# Patient Record
Sex: FEMALE | Race: WHITE | URBAN - METROPOLITAN AREA
[De-identification: names, ages, dates, MRNs, and addresses within clinical notes are randomized per-mention and may not be internally consistent; named-entity substitution may affect disease eponyms.]

---

## 2017-03-27 ENCOUNTER — DOCTOR'S OFFICE (OUTPATIENT)
Dept: URBAN - METROPOLITAN AREA CLINIC 137 | Facility: CLINIC | Age: 37
Setting detail: OPHTHALMOLOGY
End: 2017-03-27
Payer: COMMERCIAL

## 2017-03-27 DIAGNOSIS — H04.123: ICD-10-CM

## 2017-03-27 DIAGNOSIS — H52.13: ICD-10-CM

## 2017-03-27 DIAGNOSIS — H52.223: ICD-10-CM

## 2017-03-27 PROCEDURE — 92004 COMPRE OPH EXAM NEW PT 1/>: CPT | Performed by: OPHTHALMOLOGY

## 2017-03-27 ASSESSMENT — REFRACTION_AUTOREFRACTION
OS_AXIS: 168
OD_SPHERE: -0.50
OS_SPHERE: -1.25
OS_CYLINDER: +2.00
OD_AXIS: 172
OD_CYLINDER: +1.25

## 2017-03-27 ASSESSMENT — REFRACTION_MANIFEST
OD_VA1: 20/
OS_VA2: 20/
OS_VA1: 20/
OD_VA1: 20/
OD_VA2: 20/
OD_VA3: 20/
OS_VA1: 20/
OD_VA3: 20/
OS_VA3: 20/
OU_VA: 20/
OS_VA3: 20/
OD_VA2: 20/
OU_VA: 20/
OS_VA2: 20/

## 2017-03-27 ASSESSMENT — CONFRONTATIONAL VISUAL FIELD TEST (CVF)
OS_FINDINGS: FULL
OD_FINDINGS: FULL

## 2017-03-27 ASSESSMENT — REFRACTION_CURRENTRX
OD_OVR_VA: 20/
OD_OVR_VA: 20/
OS_OVR_VA: 20/
OD_OVR_VA: 20/

## 2017-03-27 ASSESSMENT — REFRACTION_OUTSIDERX
OU_VA: 20/
OD_CYLINDER: +1.25
OD_VA1: 20/20
OS_VA3: 20/
OS_SPHERE: -1.25
OS_CYLINDER: +2.00
OD_AXIS: 172
OD_SPHERE: -0.50
OS_VA2: 20/20(J1+)
OD_VA2: 20/20(J1+)
OS_VA1: 20/20
OS_AXIS: 168
OD_VA3: 20/

## 2017-03-27 ASSESSMENT — SPHEQUIV_DERIVED
OD_SPHEQUIV: 0.125
OS_SPHEQUIV: -0.25

## 2017-03-27 ASSESSMENT — VISUAL ACUITY
OS_BCVA: 20/25-
OD_BCVA: 20/40-

## 2017-04-05 ENCOUNTER — DOCTOR'S OFFICE (OUTPATIENT)
Dept: URBAN - METROPOLITAN AREA CLINIC 137 | Facility: CLINIC | Age: 37
Setting detail: OPHTHALMOLOGY
End: 2017-04-05
Payer: COMMERCIAL

## 2017-04-05 DIAGNOSIS — H52.223: ICD-10-CM

## 2017-04-05 PROCEDURE — 92310 CONTACT LENS FITTING OU: CPT | Performed by: OPHTHALMOLOGY

## 2017-04-05 ASSESSMENT — REFRACTION_MANIFEST
OS_VA1: 20/
OS_VA2: 20/
OD_VA1: 20/
OS_VA3: 20/
OS_VA2: 20/
OS_VA3: 20/
OD_VA3: 20/
OS_VA1: 20/
OD_VA1: 20/
OU_VA: 20/
OU_VA: 20/
OD_VA3: 20/
OD_VA2: 20/
OD_VA2: 20/

## 2017-04-05 ASSESSMENT — SPHEQUIV_DERIVED
OD_SPHEQUIV: 0.125
OS_SPHEQUIV: -0.25

## 2017-04-05 ASSESSMENT — REFRACTION_OUTSIDERX
OD_AXIS: 172
OS_SPHERE: -1.25
OD_VA1: 20/20
OS_VA3: 20/
OU_VA: 20/
OS_CYLINDER: +2.00
OS_VA1: 20/20
OS_AXIS: 168
OD_VA3: 20/
OD_VA2: 20/20(J1+)
OD_CYLINDER: +1.25
OS_VA2: 20/20(J1+)
OD_SPHERE: -0.50

## 2017-04-05 ASSESSMENT — REFRACTION_AUTOREFRACTION
OS_CYLINDER: +2.00
OS_SPHERE: -1.25
OD_AXIS: 172
OD_SPHERE: -0.50
OS_AXIS: 168
OD_CYLINDER: +1.25

## 2017-04-05 ASSESSMENT — REFRACTION_CURRENTRX
OS_OVR_VA: 20/
OD_OVR_VA: 20/

## 2017-04-05 ASSESSMENT — VISUAL ACUITY
OS_BCVA: 20/20
OD_BCVA: 20/20

## 2018-08-07 ENCOUNTER — OPTICAL OFFICE (OUTPATIENT)
Dept: URBAN - METROPOLITAN AREA CLINIC 146 | Facility: CLINIC | Age: 38
Setting detail: OPHTHALMOLOGY
End: 2018-08-07

## 2018-08-07 ENCOUNTER — RX ONLY (RX ONLY)
Age: 38
End: 2018-08-07

## 2018-08-07 ENCOUNTER — DOCTOR'S OFFICE (OUTPATIENT)
Dept: URBAN - METROPOLITAN AREA CLINIC 137 | Facility: CLINIC | Age: 38
Setting detail: OPHTHALMOLOGY
End: 2018-08-07
Payer: COMMERCIAL

## 2018-08-07 DIAGNOSIS — H52.13: ICD-10-CM

## 2018-08-07 DIAGNOSIS — H52.223: ICD-10-CM

## 2018-08-07 PROBLEM — H04.123 DRY EYE; BOTH EYES: Status: ACTIVE | Noted: 2017-04-05

## 2018-08-07 PROCEDURE — V2103 SPHEROCYLINDR 4.00D/12-2.00D: HCPCS | Performed by: OPHTHALMOLOGY

## 2018-08-07 PROCEDURE — V2020 VISION SVCS FRAMES PURCHASES: HCPCS | Performed by: OPHTHALMOLOGY

## 2018-08-07 PROCEDURE — 92014 COMPRE OPH EXAM EST PT 1/>: CPT | Performed by: OPHTHALMOLOGY

## 2018-08-07 ASSESSMENT — CONFRONTATIONAL VISUAL FIELD TEST (CVF)
OD_FINDINGS: FULL
OS_FINDINGS: FULL

## 2018-08-07 ASSESSMENT — SPHEQUIV_DERIVED
OD_SPHEQUIV: 0.125
OS_SPHEQUIV: -0.25

## 2018-08-07 ASSESSMENT — REFRACTION_OUTSIDERX
OD_VA3: 20/
OS_AXIS: 170
OU_VA: 20/
OD_CYLINDER: +1.00
OS_CYLINDER: +2.00
OD_VA1: 20/20
OS_SPHERE: -1.25
OS_VA2: 20/20(J1+)
OS_CYLINDER: +2.00
OD_VA1: 20/20
OS_VA1: 20/20
OD_SPHERE: -0.50
OD_VA2: 20/20(J1+)
OS_VA1: 20/20
OS_VA3: 20/
OS_AXIS: 168
OD_VA2: 20/20(J1+)
OS_VA2: 20/20(J1+)
OD_CYLINDER: +1.25
OU_VA: 20/
OS_SPHERE: -1.25
OD_VA3: 20/
OS_VA3: 20/
OD_AXIS: 170
OD_AXIS: 172
OD_SPHERE: -0.50

## 2018-08-07 ASSESSMENT — REFRACTION_AUTOREFRACTION
OD_CYLINDER: +1.25
OS_CYLINDER: +2.00
OS_SPHERE: -1.25
OD_AXIS: 168
OS_AXIS: 175
OD_SPHERE: -0.50

## 2018-08-07 ASSESSMENT — REFRACTION_CURRENTRX
OD_OVR_VA: 20/
OS_OVR_VA: 20/

## 2018-08-07 ASSESSMENT — REFRACTION_MANIFEST
OU_VA: 20/
OS_VA2: 20/
OD_VA3: 20/
OD_VA1: 20/
OS_VA1: 20/
OS_VA3: 20/
OD_VA2: 20/

## 2018-08-07 ASSESSMENT — VISUAL ACUITY
OD_BCVA: 20/40
OS_BCVA: 20/25

## 2018-12-11 ENCOUNTER — TRANSCRIBE ORDERS (OUTPATIENT)
Dept: ADMINISTRATIVE | Age: 38
End: 2018-12-11

## 2018-12-11 ENCOUNTER — APPOINTMENT (OUTPATIENT)
Dept: LAB | Age: 38
End: 2018-12-11
Attending: PREVENTIVE MEDICINE

## 2018-12-11 DIAGNOSIS — Z02.1 ENCOUNTER FOR PRE-EMPLOYMENT EXAMINATION: Primary | ICD-10-CM

## 2018-12-11 DIAGNOSIS — Z02.1 ENCOUNTER FOR PRE-EMPLOYMENT EXAMINATION: ICD-10-CM

## 2018-12-11 PROCEDURE — 86480 TB TEST CELL IMMUN MEASURE: CPT

## 2018-12-11 PROCEDURE — 36415 COLL VENOUS BLD VENIPUNCTURE: CPT

## 2018-12-13 LAB
GAMMA INTERFERON BACKGROUND BLD IA-ACNC: 0.03 IU/ML
M TB IFN-G BLD-IMP: NEGATIVE
M TB IFN-G CD4+ BCKGRND COR BLD-ACNC: 0 IU/ML
M TB IFN-G CD4+ BCKGRND COR BLD-ACNC: 0 IU/ML
MITOGEN IGNF BCKGRD COR BLD-ACNC: >10 IU/ML

## 2019-07-09 ENCOUNTER — OFFICE VISIT (OUTPATIENT)
Dept: URGENT CARE | Facility: HOSPITAL | Age: 39
End: 2019-07-09
Payer: COMMERCIAL

## 2019-07-09 VITALS
WEIGHT: 132.4 LBS | HEART RATE: 75 BPM | RESPIRATION RATE: 18 BRPM | DIASTOLIC BLOOD PRESSURE: 72 MMHG | SYSTOLIC BLOOD PRESSURE: 114 MMHG | HEIGHT: 63 IN | BODY MASS INDEX: 23.46 KG/M2 | TEMPERATURE: 99 F | OXYGEN SATURATION: 98 %

## 2019-07-09 DIAGNOSIS — L23.7 ALLERGIC DERMATITIS DUE TO POISON IVY: Primary | ICD-10-CM

## 2019-07-09 PROCEDURE — 99203 OFFICE O/P NEW LOW 30 MIN: CPT

## 2019-07-09 RX ORDER — PREDNISONE 20 MG/1
TABLET ORAL
Qty: 15 TABLET | Refills: 0 | Status: SHIPPED | OUTPATIENT
Start: 2019-07-09 | End: 2019-07-27 | Stop reason: ALTCHOICE

## 2019-07-09 NOTE — PATIENT INSTRUCTIONS
Prednisone and Betamethasone prescriptions sent to [facility]  Return if signs of bacterial infection as discussed  Call or return for problems/concerns

## 2019-07-09 NOTE — PROGRESS NOTES
[facility]        NAME: Isabella Avalos is a 46 y o  female  : 1980    MRN: [id number]  DATE: 2019  TIME: 1:43 PM    Assessment and Plan   Allergic dermatitis due to poison ivy [L23 7]  1  Allergic dermatitis due to poison ivy  betamethasone valerate (VALISONE) 0 1 % cream    predniSONE 20 mg tablet         Patient Instructions     Patient Instructions   Prednisone and Betamethasone prescriptions sent to [facility]  Return if signs of bacterial infection as discussed  Call or return for problems/concerns    Follow up with PCP in 3-5 days  Proceed to  ER if symptoms worsen  Chief Complaint     Chief Complaint   Patient presents with    Rash     possible poison x 1 week both arms and legs         History of Present Illness       Rash on b/l arms x 1 week- itchy  Was exposed to poison when weeding her yard      Review of Systems   Review of Systems   Skin: Positive for rash  All other systems reviewed and are negative  Current Medications       Current Outpatient Medications:     betamethasone valerate (VALISONE) 0 1 % cream, Apply topically 2 (two) times a day, Disp: 30 g, Rfl: 0    predniSONE 20 mg tablet, 20mg PO BID x 5 days, then 20mg PO daily x 5 days, Disp: 15 tablet, Rfl: 0    Current Allergies     Allergies as of 2019    (No Known Allergies)            The following portions of the patient's history were reviewed and updated as appropriate: allergies, current medications, past family history, past medical history, past social history, past surgical history and problem list      History reviewed  No pertinent past medical history  Past Surgical History:   Procedure Laterality Date    TUBAL LIGATION         No family history on file  Medications have been verified          Objective   /72 (BP Location: Right arm, Patient Position: Sitting, Cuff Size: Adult)   Pulse 75   Temp 99 °F (37 2 °C) (Tympanic)   Resp 18   Ht 5' 3" (1 6 m)   Wt 60 1 kg (132 lb 6 4 oz)   SpO2 98%   BMI 23 45 kg/m²        Physical Exam     Physical Exam   Constitutional: She is oriented to person, place, and time  Vital signs are normal  She appears well-developed and well-nourished  She is active and cooperative  No distress  Eyes: EOM are normal    Cardiovascular: Normal rate, regular rhythm and normal heart sounds  No murmur heard  Pulmonary/Chest: Effort normal and breath sounds normal  No respiratory distress  She has no wheezes  Neurological: She is alert and oriented to person, place, and time  Skin: Skin is warm and dry  Rash noted  She is not diaphoretic  No erythema  Psychiatric: She has a normal mood and affect  Her behavior is normal  Judgment and thought content normal    Nursing note and vitals reviewed

## 2019-07-27 ENCOUNTER — OFFICE VISIT (OUTPATIENT)
Dept: URGENT CARE | Facility: MEDICAL CENTER | Age: 39
End: 2019-07-27
Payer: COMMERCIAL

## 2019-07-27 VITALS
OXYGEN SATURATION: 99 % | DIASTOLIC BLOOD PRESSURE: 76 MMHG | TEMPERATURE: 97.9 F | SYSTOLIC BLOOD PRESSURE: 116 MMHG | RESPIRATION RATE: 20 BRPM | HEART RATE: 83 BPM

## 2019-07-27 DIAGNOSIS — L23.7 POISON IVY DERMATITIS: Primary | ICD-10-CM

## 2019-07-27 PROCEDURE — 99214 OFFICE O/P EST MOD 30 MIN: CPT | Performed by: FAMILY MEDICINE

## 2019-07-27 RX ORDER — PREDNISONE 10 MG/1
TABLET ORAL
Qty: 30 TABLET | Refills: 0 | Status: SHIPPED | OUTPATIENT
Start: 2019-07-27 | End: 2020-02-19

## 2019-07-27 NOTE — PROGRESS NOTES
[facility]        NAME: Noman Mott is a 46 y o  female  : 1980    MRN: [id number]  DATE: 2019  TIME: 10:37 AM    Assessment and Plan   Poison ivy dermatitis [L23 7]  1  Poison ivy dermatitis  predniSONE 10 mg tablet         Patient Instructions       Follow up with PCP in 3-5 days  Proceed to  ER if symptoms worsen  Chief Complaint     Chief Complaint   Patient presents with    Rash     Pt with red, itchy rash on bilateral forearms since yesterday  Had been working outside in yard  History of Present Illness        80-year-old female presents with the poison ivy rash that started few days back  She states that she was in push  She was also recently on prednisone for sumac poisoning a month ago  She does have localized itching  Rash is on upper and lower extremities  Review of Systems   Review of Systems  As above     Current Medications       Current Outpatient Medications:     predniSONE 10 mg tablet, 5 X 2 days, 4 X 2, 3 X 2, 2 X 2, 1 x 2, Disp: 30 tablet, Rfl: 0    Current Allergies     Allergies as of 2019    (No Known Allergies)            The following portions of the patient's history were reviewed and updated as appropriate: allergies, current medications, past family history, past medical history, past social history, past surgical history and problem list      History reviewed  No pertinent past medical history  Past Surgical History:   Procedure Laterality Date    TUBAL LIGATION         No family history on file  Medications have been verified  Objective   /76 (BP Location: Right arm, Patient Position: Sitting, Cuff Size: Standard)   Pulse 83   Temp 97 9 °F (36 6 °C) (Tympanic)   Resp 20   SpO2 99%        Physical Exam     Physical Exam   Constitutional: She is oriented to person, place, and time  She appears well-developed and well-nourished  HENT:   Head: Normocephalic and atraumatic     Eyes: Conjunctivae are normal    Neck: Neck supple  Cardiovascular: Normal rate  Pulmonary/Chest: Effort normal  No respiratory distress  Abdominal: Soft  Musculoskeletal: Normal range of motion  Neurological: She is alert and oriented to person, place, and time  Skin: Skin is warm and dry  Maculopapular rash on upper and lower extremities   Psychiatric: She has a normal mood and affect  Her behavior is normal    Nursing note and vitals reviewed

## 2019-08-29 ENCOUNTER — OFFICE VISIT (OUTPATIENT)
Dept: URGENT CARE | Facility: HOSPITAL | Age: 39
End: 2019-08-29
Payer: COMMERCIAL

## 2019-08-29 VITALS
DIASTOLIC BLOOD PRESSURE: 70 MMHG | TEMPERATURE: 94 F | HEIGHT: 63 IN | BODY MASS INDEX: 25.3 KG/M2 | OXYGEN SATURATION: 94 % | RESPIRATION RATE: 18 BRPM | WEIGHT: 142.8 LBS | SYSTOLIC BLOOD PRESSURE: 110 MMHG | HEART RATE: 74 BPM

## 2019-08-29 DIAGNOSIS — L24.7 IRRITANT CONTACT DERMATITIS DUE TO PLANTS, EXCEPT FOOD: Primary | ICD-10-CM

## 2019-08-29 PROCEDURE — 99213 OFFICE O/P EST LOW 20 MIN: CPT | Performed by: EMERGENCY MEDICINE

## 2019-08-29 RX ORDER — CLOTRIMAZOLE AND BETAMETHASONE DIPROPIONATE 10; .64 MG/G; MG/G
CREAM TOPICAL 2 TIMES DAILY
Qty: 30 G | Refills: 0 | Status: SHIPPED | OUTPATIENT
Start: 2019-08-29 | End: 2020-02-19

## 2019-08-29 RX ORDER — CEPHALEXIN 500 MG/1
500 CAPSULE ORAL EVERY 12 HOURS SCHEDULED
Qty: 14 CAPSULE | Refills: 0 | Status: SHIPPED | OUTPATIENT
Start: 2019-08-29 | End: 2019-09-05

## 2019-08-29 NOTE — PATIENT INSTRUCTIONS
1   Benedryl 25 - 50 mgs every 6 hours for itching  2  Calamine lotion to areas of rash for comfort  3  Avoid all areas where poison may be growing  4  Follow up with your family doctor if no improvement  Contact Dermatitis   WHAT YOU NEED TO KNOW:   Contact dermatitis is a skin rash  It develops when you touch something that irritates your skin or causes an allergic reaction  DISCHARGE INSTRUCTIONS:   Call 911 for any of the following:   · You have sudden trouble breathing  · Your throat swells and you have trouble eating  · Your face is swollen  Contact your healthcare provider if:   · You have a fever  · Your blisters are draining pus  · Your rash spreads or does not get better, even after treatment  · You have questions or concerns about your condition or care  Medicines:   · Medicines  help decrease itching and swelling  They will be given as a topical medicine to apply to your rash or as a pill  · Take your medicine as directed  Contact your healthcare provider if you think your medicine is not helping or if you have side effects  Tell him or her if you are allergic to any medicine  Keep a list of the medicines, vitamins, and herbs you take  Include the amounts, and when and why you take them  Bring the list or the pill bottles to follow-up visits  Carry your medicine list with you in case of an emergency  Manage contact dermatitis:   · Take short baths or showers in cool water  Use mild soap or soap-free cleansers  Add oatmeal, baking soda, or cornstarch to the bath water to help decrease skin irritation  · Avoid skin irritants , such as makeup, hair products, soaps, and cleansers  Use products that do not contain perfume or dye  · Apply a cool compress to your rash  This will help soothe your skin  · Keep your skin moist   Rub unscented cream or lotion on your skin to prevent dryness and itching   Do this right after a bath or shower when your skin is still damp  Follow up with your healthcare provider or dermatologist in 2 to 3 days:  Write down your questions so you remember to ask them during your visits  © 2017 [employer] Information is for End User's use only and may not be sold, redistributed or otherwise used for commercial purposes  All illustrations and images included in CareNotes® are the copyrighted property of A D A M [employer] Inc  or Dejuan Baltazar  The above information is an  only  It is not intended as medical advice for individual conditions or treatments  Talk to your doctor, nurse or pharmacist before following any medical regimen to see if it is safe and effective for you

## 2019-08-29 NOTE — PROGRESS NOTES
[facility]        NAME: Jannet Young is a 46 y o  female  : 1980    MRN: [id number]  DATE: 2019  TIME: 9:18 AM    Assessment and Plan   Irritant contact dermatitis due to plants, except food [L24 7]  1  Irritant contact dermatitis due to plants, except food  clotrimazole-betamethasone (LOTRISONE) 1-0 05 % cream    cephalexin (KEFLEX) 500 mg capsule     Lotrisone cream 1%      Patient Instructions       Follow up with PCP in 3-5 days  Proceed to  ER if symptoms worsen  Chief Complaint     Chief Complaint   Patient presents with    Rash     Started last thursday    Insect Bite     started last thursday    Edema     swelling on legs and arms         History of Present Illness       This is a 78-year-old female who presents with recurrence rash after she had pulled some weeds from her yd approximately 8 days ago  The rash has progressed on her arms and legs  She has recently been treated twice for poison ivy contact dermatitis and has been on oral steroids  She states the steroids have caused mood disorder  She does note that there is edema and now onset of pain in the right arm reaching into her right axilla  She denies fever  Review of Systems   Review of Systems   Constitutional: Negative  Negative for fever  HENT: Negative  Negative for congestion  Eyes: Negative  Skin: Negative  Rash over her upper and lower extremities  Neurological: Negative  Psychiatric/Behavioral: Negative            Current Medications       Current Outpatient Medications:     cephalexin (KEFLEX) 500 mg capsule, Take 1 capsule (500 mg total) by mouth every 12 (twelve) hours for 7 days, Disp: 14 capsule, Rfl: 0    clotrimazole-betamethasone (LOTRISONE) 1-0 05 % cream, Apply topically 2 (two) times a day, Disp: 30 g, Rfl: 0    predniSONE 10 mg tablet, 5 X 2 days, 4 X 2, 3 X 2, 2 X 2, 1 x 2 (Patient not taking: Reported on 2019), Disp: 30 tablet, Rfl: 0    Current Allergies     Allergies as of 08/29/2019    (No Known Allergies)            The following portions of the patient's history were reviewed and updated as appropriate: allergies, current medications, past family history, past medical history, past social history, past surgical history and problem list      History reviewed  No pertinent past medical history  Past Surgical History:   Procedure Laterality Date    TUBAL LIGATION         History reviewed  No pertinent family history  Medications have been verified  Objective   /70 (BP Location: Left arm, Patient Position: Sitting, Cuff Size: Adult)   Pulse 74   Temp (!) 94 °F (34 4 °C) (Tympanic)   Resp 18   Ht 5' 3" (1 6 m)   Wt 64 8 kg (142 lb 12 8 oz)   SpO2 94%   BMI 25 30 kg/m²        Physical Exam     Physical Exam   Constitutional: She is oriented to person, place, and time  She appears well-developed and well-nourished  Eyes: Pupils are equal, round, and reactive to light  Conjunctivae and EOM are normal    Neck: Normal range of motion  Neck supple  Cardiovascular: Normal rate and regular rhythm  Pulmonary/Chest: Effort normal and breath sounds normal    Musculoskeletal: Normal range of motion  Neurological: She is alert and oriented to person, place, and time  Skin: Skin is dry  There is a generalized rash over the extensor surfaces of both upper extremities from wrist to shoulder worse on the right than the left  The rash  is confluent in areas and patchy in others, raised and vesicular in a linear pattern  There is mild erythema  Similar rashes noted on the inner thighs bilaterally  Nursing note and vitals reviewed  I have discussed the need for an oral steroid because of the diffuse nature of the rash  Patient refuses prednisone at this time as she has had two courses of the oral steroid in the recent past and it has caused mood changes    I have suggested that the betamethasone cream which she is requesting may not be enough to help with the rash at this point  Keflex added for the pain and swelling noted in the right upper extremity  Pt new to the area has no local family doctor  A list of physicians given to patient

## 2020-02-19 ENCOUNTER — OFFICE VISIT (OUTPATIENT)
Dept: FAMILY MEDICINE CLINIC | Facility: CLINIC | Age: 40
End: 2020-02-19
Payer: COMMERCIAL

## 2020-02-19 VITALS
RESPIRATION RATE: 17 BRPM | DIASTOLIC BLOOD PRESSURE: 74 MMHG | OXYGEN SATURATION: 99 % | HEART RATE: 91 BPM | TEMPERATURE: 97.7 F | HEIGHT: 64 IN | SYSTOLIC BLOOD PRESSURE: 112 MMHG | BODY MASS INDEX: 24.59 KG/M2 | WEIGHT: 144 LBS

## 2020-02-19 DIAGNOSIS — Z76.89 ENCOUNTER TO ESTABLISH CARE: ICD-10-CM

## 2020-02-19 DIAGNOSIS — Z11.4 SCREENING FOR HIV (HUMAN IMMUNODEFICIENCY VIRUS): ICD-10-CM

## 2020-02-19 DIAGNOSIS — Z12.4 CERVICAL CANCER SCREENING: ICD-10-CM

## 2020-02-19 DIAGNOSIS — Z13.220 LIPID SCREENING: ICD-10-CM

## 2020-02-19 DIAGNOSIS — Z13.0 SCREENING FOR DEFICIENCY ANEMIA: ICD-10-CM

## 2020-02-19 DIAGNOSIS — Z13.1 DIABETES MELLITUS SCREENING: ICD-10-CM

## 2020-02-19 DIAGNOSIS — Z13.29 THYROID DISORDER SCREEN: ICD-10-CM

## 2020-02-19 DIAGNOSIS — Z00.00 ANNUAL PHYSICAL EXAM: Primary | ICD-10-CM

## 2020-02-19 PROCEDURE — 3008F BODY MASS INDEX DOCD: CPT | Performed by: PHYSICIAN ASSISTANT

## 2020-02-19 PROCEDURE — 99385 PREV VISIT NEW AGE 18-39: CPT | Performed by: PHYSICIAN ASSISTANT

## 2020-02-19 NOTE — PATIENT INSTRUCTIONS

## 2020-02-19 NOTE — PROGRESS NOTES
[address]     NAME: Maki Curry  AGE: 45 y o  SEX: female  : 1980     DATE: 2020     Assessment and Plan:     Problem List Items Addressed This Visit     None      Visit Diagnoses     Annual physical exam    -  Primary    Diabetes mellitus screening        Relevant Orders    Comprehensive metabolic panel    Lipid screening        Relevant Orders    Lipid panel    Screening for HIV (human immunodeficiency virus)        Relevant Orders    HIV 1/2 AG-AB combo    Cervical cancer screening        Relevant Orders    Ambulatory referral to Gynecology    Encounter to establish care        BMI 25 0-25 9,adult        Screening for deficiency anemia        Relevant Orders    CBC and differential    Thyroid disorder screen        Relevant Orders    TSH, 3rd generation with Free T4 reflex          Immunizations and preventive care screenings were discussed with patient today  Appropriate education was printed on patient's after visit summary  Counseling:  Dental Health: discussed importance of regular tooth brushing, flossing, and dental visits  · Exercise: the importance of regular exercise/physical activity was discussed  Recommend exercise 3-5 times per week for at least 30 minutes  · Schedule GYN exam at earliest convenience    BMI Counseling: Body mass index is 25 11 kg/m²  The BMI is above normal  Nutrition recommendations include decreasing portion sizes and encouraging healthy choices of fruits and vegetables  Exercise recommendations include exercising 3-5 times per week  Return in 1 year (on 2021)  Chief Complaint:     Chief Complaint   Patient presents with    Establish Care     Needs physical for employment - has form to be completed  PPD not needed     Unsure when last pap was - completed at facility on [facility]       History of Present Illness:     Adult Annual Physical     Patient here for a comprehensive physical exam and to establish care  The patient reports no problems  She was going to [facility], over 1 year ago  She went to Dr Kamran Yadav in [facility] within last 5 years for GYN but is overdue  She and  just moved to area, bought land  She had 4 children  She is from [place]  She needs physical to start [employer] job  She has no physical ailments requiring limitations  [email or web address] on Friday  Diet and Physical Activity  · Diet/Nutrition: well balanced diet  · Exercise: 3-4 times a week on average  Depression Screening  PHQ-9 Depression Screening    PHQ-9:    Frequency of the following problems over the past two weeks:       Little interest or pleasure in doing things:  0 - not at all  Feeling down, depressed, or hopeless:  0 - not at all  PHQ-2 Score:  0       General Health  · Sleep: sleeps well, gets 4-6 hours of sleep on average, gets 7-8 hours of sleep on average and she works evening shift  · Hearing: normal - right  · Vision: goes for regular eye exams and wears glasses  · Dental: regular dental visits  [address]  · Last menstrual period: last week  · Contraceptive method: tubal ligation in 2010  · History of STDs?: no      Review of Systems:     Review of Systems   Constitutional: Negative for chills, fatigue, fever and unexpected weight change  HENT: Negative for congestion, ear pain, hearing loss, nosebleeds, sore throat and trouble swallowing  Eyes: Negative for pain, discharge and visual disturbance  Respiratory: Negative for cough, shortness of breath and wheezing  Cardiovascular: Negative for chest pain, palpitations and leg swelling  Gastrointestinal: Negative for abdominal pain, blood in stool, constipation, diarrhea, nausea and vomiting  Endocrine: Negative for cold intolerance and heat intolerance     Genitourinary: Negative for difficulty urinating, dysuria and hematuria  Musculoskeletal: Negative for arthralgias, gait problem and myalgias  Skin: Negative for color change, rash and wound  Neurological: Negative for dizziness, syncope, weakness, light-headedness and headaches  Hematological: Negative for adenopathy  Does not bruise/bleed easily  Psychiatric/Behavioral: Negative for confusion and sleep disturbance  The patient is not nervous/anxious  Past Medical History:     History reviewed  No pertinent past medical history     Past Surgical History:     Past Surgical History:   Procedure Laterality Date    TUBAL LIGATION        Social History:        Social History     Socioeconomic History    Marital status: /Civil Union     Spouse name: None    Number of children: None    Years of education: None    Highest education level: None   Occupational History    None   Social Needs    Financial resource strain: None    Food insecurity:     Worry: None     Inability: None    Transportation needs:     Medical: No     Non-medical: No   Tobacco Use    Smoking status: Never Smoker    Smokeless tobacco: Never Used   Substance and Sexual Activity    Alcohol use: Yes     Comment: occasionally     Drug use: Never    Sexual activity: Yes     Partners: Male   Lifestyle    Physical activity:     Days per week: 4 days     Minutes per session: None    Stress: None   Relationships    Social connections:     Talks on phone: None     Gets together: None     Attends Oriental orthodox service: None     Active member of club or organization: None     Attends meetings of clubs or organizations: None     Relationship status: None    Intimate partner violence:     Fear of current or ex partner: None     Emotionally abused: None     Physically abused: None     Forced sexual activity: None   Other Topics Concern    None   Social History Narrative    None      Family History:     Family History   Problem Relation Age of Onset    Stroke Maternal Grandmother Current Medications:     Current Outpatient Medications   Medication Sig Dispense Refill    Biotin 5000 MCG CAPS Take 1 capsule by mouth daily       No current facility-administered medications for this visit  Allergies:     No Known Allergies   Physical Exam:     /74 (BP Location: Left arm, Patient Position: Sitting)   Pulse 91   Temp 97 7 °F (36 5 °C) (Tympanic)   Resp 17   Ht 5' 3 5" (1 613 m)   Wt 65 3 kg (144 lb)   SpO2 99%   BMI 25 11 kg/m²     Physical Exam   Constitutional: She is oriented to person, place, and time  Vital signs are normal  She appears well-developed and well-nourished  No distress  HENT:   Head: Normocephalic and atraumatic  Right Ear: Tympanic membrane, external ear and ear canal normal    Left Ear: Tympanic membrane, external ear and ear canal normal    Nose: Nose normal    Mouth/Throat: Oropharynx is clear and moist    Eyes: Pupils are equal, round, and reactive to light  Conjunctivae and lids are normal    Neck: Trachea normal and normal range of motion  Neck supple  Cardiovascular: Normal rate, regular rhythm, S1 normal, S2 normal and normal heart sounds  No murmur heard  Pulmonary/Chest: Effort normal and breath sounds normal  No respiratory distress  She has no wheezes  She has no rhonchi  She has no rales  Abdominal: Soft  Normal appearance and bowel sounds are normal  She exhibits no distension  There is no hepatosplenomegaly  Musculoskeletal: Normal range of motion  She exhibits no edema or deformity  Neurological: She is alert and oriented to person, place, and time  She has normal reflexes  No cranial nerve deficit or sensory deficit  Skin: Skin is warm and dry  No rash noted  No cyanosis  No pallor  Nails show no clubbing  Psychiatric: She has a normal mood and affect   Her behavior is normal  Cognition and memory are normal        Chase Reveles PA-C   [facility]

## 2020-09-22 ENCOUNTER — TELEMEDICINE (OUTPATIENT)
Dept: FAMILY MEDICINE CLINIC | Facility: CLINIC | Age: 40
End: 2020-09-22
Payer: COMMERCIAL

## 2020-09-22 DIAGNOSIS — L25.5 RHUS DERMATITIS: ICD-10-CM

## 2020-09-22 DIAGNOSIS — J02.9 SORE THROAT: ICD-10-CM

## 2020-09-22 DIAGNOSIS — J01.10 ACUTE FRONTAL SINUSITIS, RECURRENCE NOT SPECIFIED: Primary | ICD-10-CM

## 2020-09-22 PROCEDURE — 99214 OFFICE O/P EST MOD 30 MIN: CPT | Performed by: FAMILY MEDICINE

## 2020-09-22 PROCEDURE — 1036F TOBACCO NON-USER: CPT | Performed by: FAMILY MEDICINE

## 2020-09-22 RX ORDER — TRIAMCINOLONE ACETONIDE 1 MG/G
CREAM TOPICAL 2 TIMES DAILY
Qty: 30 G | Refills: 1 | Status: SHIPPED | OUTPATIENT
Start: 2020-09-22 | End: 2021-11-30 | Stop reason: ALTCHOICE

## 2020-09-22 RX ORDER — AMOXICILLIN AND CLAVULANATE POTASSIUM 875; 125 MG/1; MG/1
1 TABLET, FILM COATED ORAL EVERY 12 HOURS SCHEDULED
Qty: 14 TABLET | Refills: 0 | Status: SHIPPED | OUTPATIENT
Start: 2020-09-22 | End: 2020-09-29

## 2020-09-22 NOTE — LETTER
Bon Blackwell was seen and treated by our office for non-COVID related complaints on 9/22/2020  She may return to work on 09/23/2020  If you have any questions or concerns, please don't hesitate to call        Sincerely,        JULIETTE Johnson

## 2020-09-22 NOTE — PROGRESS NOTES
Virtual Regular Visit      Assessment/Plan:    Problem List Items Addressed This Visit     None      Visit Diagnoses     Acute frontal sinusitis, recurrence not specified    -  Primary    Relevant Medications    amoxicillin-clavulanate (Augmentin) 875-125 mg per tablet    Sore throat        likley due to postnasal drip    Rhus dermatitis        Relevant Medications    triamcinolone (KENALOG) 0 1 % cream        Also advised Saline nasal spray and warm SWG's  Call back if sx worsen or no better next 24hours  Work note emailed to pt - tentatively ok to return night shift tomorrow night       Reason for visit is sick  Chief Complaint   Patient presents with    Virtual Regular Visit        Encounter provider Suraj Martini DO    Provider located at [address] [place] [address], Se  [address] [facility] Nandini Gifford      Recent Visits  Date Type Provider Dept   09/22/20 Telemedicine Pamela Salgado Robbie Zayas recent visits within past 7 days and meeting all other requirements     Future Appointments  No visits were found meeting these conditions  Showing future appointments within next 150 days and meeting all other requirements        The patient was identified by name and date of birth  Petra Mata was informed that this is a telemedicine visit and that the visit is being conducted through [address] [place] [address] and patient was informed that this is not a secure, HIPAA-complaint platform  She agrees to proceed     My office door was closed  No one else was in the room  She acknowledged consent and understanding of privacy and security of the video platform  The patient has agreed to participate and understands they can discontinue the visit at any time  Patient is aware this is a billable service  Subjective  Petra Mata is a 45 y o  female     C/o "yesterday morning started seasonal runny nose, sneezing, when went home from work throat started hurting, last night getting worse, ears hurt, head congestion, they gave us a chart of what's COVID, what's seasonal, what's in between, and didn't know with the sore throat, and 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," and called for appt  No fever  No meds used  "Do get allergy sx like this this time of year, but not to this extent with the throat and the ears"  Works at [address] [place] [address] [employer]  "Do have another unrelated issue- poison ivy all over my legs, keep getting it since moved here few months ago, last 2 times gave me a steroid cream, but I don't have any left"  HPI     History reviewed  No pertinent past medical history  Past Surgical History:   Procedure Laterality Date    TUBAL LIGATION         Current Outpatient Medications   Medication Sig Dispense Refill    Biotin 5000 MCG CAPS Take 1 capsule by mouth daily      amoxicillin-clavulanate (Augmentin) 875-125 mg per tablet Take 1 tablet by mouth every 12 (twelve) hours for 7 days 14 tablet 0    triamcinolone (KENALOG) 0 1 % cream Apply topically 2 (two) times a day 30 g 1     No current facility-administered medications for this visit  No Known Allergies    Review of Systems   All other systems reviewed and are negative  Video Exam    There were no vitals filed for this visit  Physical Exam  Constitutional:       General: She is not in acute distress  Appearance: She is well-developed  She is not ill-appearing, toxic-appearing or diaphoretic  HENT:      Head: Normocephalic and atraumatic  Nose:      Right Sinus: Frontal sinus tenderness present  No maxillary sinus tenderness  Left Sinus: Frontal sinus tenderness present  No maxillary sinus tenderness  Mouth/Throat:      Tongue: No lesions  Pharynx: Uvula midline  Posterior oropharyngeal erythema (faint) present  Tonsils: No tonsillar exudate  0 on the right  0 on the left     Neck:      Trachea: Phonation normal    Pulmonary:      Effort: Pulmonary effort is normal    Skin:     Coloration: Skin is not pale  Comments: Contact derm rash in linear pattern left leg, no crust or scale   Neurological:      Mental Status: She is alert and oriented to person, place, and time  Psychiatric:         Behavior: Behavior is cooperative  Jimenez Lovelace was seen today for virtual regular visit  Diagnoses and all orders for this visit:    Acute frontal sinusitis, recurrence not specified  -     amoxicillin-clavulanate (Augmentin) 875-125 mg per tablet; Take 1 tablet by mouth every 12 (twelve) hours for 7 days    Sore throat  Comments:  likley due to postnasal drip    Rhus dermatitis  -     triamcinolone (KENALOG) 0 1 % cream; Apply topically 2 (two) times a day         Also advised Saline nasal spray and warm SWG's  Call back if sx worsen or no better next 24hours  Work note emailed to pt - tentatively ok to return night shift tomorrow night  I spent 18 minutes directly with the patient during this visit      VIRTUAL VISIT DISCLAIMER    Cristopher Liang acknowledges that she has consented to an online visit or consultation  She understands that the online visit is based solely on information provided by her, and that, in the absence of a face-to-face physical evaluation by the physician, the diagnosis she receives is both limited and provisional in terms of accuracy and completeness  This is not intended to replace a full medical face-to-face evaluation by the physician  Cristopher Liang understands and accepts these terms

## 2020-09-22 NOTE — LETTER
September 22, 2020     Patient: Suzanne Grubbs   YOB: 1980   Date of Visit: 9/22/2020       To Whom it May Concern:    Ahmed Organ is under my professional care  She was seen in my office on 9/22/2020  If you have any questions or concerns, please don't hesitate to call           Sincerely,          Jean Paul Pierre, DO        CC: No Recipients

## 2021-07-09 ENCOUNTER — OFFICE VISIT (OUTPATIENT)
Dept: FAMILY MEDICINE CLINIC | Facility: CLINIC | Age: 41
End: 2021-07-09
Payer: COMMERCIAL

## 2021-07-09 VITALS
DIASTOLIC BLOOD PRESSURE: 68 MMHG | HEART RATE: 90 BPM | SYSTOLIC BLOOD PRESSURE: 104 MMHG | BODY MASS INDEX: 26.49 KG/M2 | HEIGHT: 65 IN | WEIGHT: 159 LBS | TEMPERATURE: 100 F | OXYGEN SATURATION: 98 %

## 2021-07-09 DIAGNOSIS — L25.5 RHUS DERMATITIS: Primary | ICD-10-CM

## 2021-07-09 PROCEDURE — 99213 OFFICE O/P EST LOW 20 MIN: CPT | Performed by: FAMILY MEDICINE

## 2021-07-09 PROCEDURE — 3008F BODY MASS INDEX DOCD: CPT | Performed by: FAMILY MEDICINE

## 2021-07-09 PROCEDURE — 3725F SCREEN DEPRESSION PERFORMED: CPT | Performed by: FAMILY MEDICINE

## 2021-07-09 PROCEDURE — 1036F TOBACCO NON-USER: CPT | Performed by: FAMILY MEDICINE

## 2021-07-09 RX ORDER — PREDNISONE 20 MG/1
TABLET ORAL
Qty: 10 TABLET | Refills: 0 | Status: SHIPPED | OUTPATIENT
Start: 2021-07-09 | End: 2021-07-18 | Stop reason: ALTCHOICE

## 2021-07-09 NOTE — PROGRESS NOTES
Assessment/Plan:    Emeli Gavin was seen today for poison ivy  Diagnoses and all orders for this visit:    Rhus dermatitis  Comments:  advised 50-50 aaron/water soaks, continue triamcinolone but not for face, groin or breasts, go to ER if sx worsen  Orders:  -     predniSONE 20 mg tablet; 3 PO day 1, then 2 PO QD x 2 days, then 1 PO QD x 2 days, then half PO QD x 2 days                Subjective:   Chief Complaint   Patient presents with   [facility]     Started on face Wednesday evening  Patient has been using steroid cream and taking benadryl  Patient ID: Eliot Mark is a 37 y o  female  Same day sick appt  C/o Poison ivy  Stopped to move a downed tree limb from in front of her driveway wed morning, did not know there was poison ivy on it, by wed evening started feeling around ears and left eye, and progressively has gotten worse- entire face, neck, arms, thighs, a little spot left flank  Applying ice, taking benadryl and used left over triamcinolone cream  Starting to feel it inside mouth this morning   States has reacted to poison ivy before, but never this bad  No SOB or HUTSON, no throat swelling      The following portions of the patient's history were reviewed and updated as appropriate: allergies, current medications, past family history, past medical history, past social history, past surgical history and problem list     Review of Systems   Constitutional: Negative  Objective:      /68 (BP Location: Left arm, Patient Position: Sitting, Cuff Size: Standard)   Pulse 90   Temp 100 °F (37 8 °C) (Tympanic)   Ht 5' 4 5" (1 638 m)   Wt 72 1 kg (159 lb)   SpO2 98%   BMI 26 87 kg/m²          Physical Exam  Constitutional:       General: She is not in acute distress  Appearance: She is well-developed  She is not ill-appearing, toxic-appearing or diaphoretic  HENT:      Head: Normocephalic and atraumatic          Comments: Large contact derm areas central and left face, b/l ears, anterior neck; mild swelling around nose and lower face     Ears:      Comments: B/l canals and external ears with erythematous papular rash and some excoriations left ear     Nose: Nose normal       Mouth/Throat:      Mouth: Mucous membranes are moist       Tongue: No lesions  Pharynx: Uvula midline  No pharyngeal swelling, oropharyngeal exudate or uvula swelling  Tonsils: 0 on the right  0 on the left  Comments: Slightly injected post pharynx  Neck:      Trachea: Phonation normal    Pulmonary:      Effort: Pulmonary effort is normal    Skin:     Coloration: Skin is not pale  Comments: Contact derm areas b/l lower arms, left flank- erythematous maculopap with some excoriations, no vesicles, bullae, pustules, crust ot scale   Neurological:      Mental Status: She is alert and oriented to person, place, and time  Psychiatric:         Behavior: Behavior is cooperative

## 2021-08-13 ENCOUNTER — VBI (OUTPATIENT)
Dept: ADMINISTRATIVE | Facility: OTHER | Age: 41
End: 2021-08-13

## 2021-12-13 ENCOUNTER — TELEMEDICINE (OUTPATIENT)
Dept: FAMILY MEDICINE CLINIC | Facility: CLINIC | Age: 41
End: 2021-12-13
Payer: COMMERCIAL

## 2021-12-13 DIAGNOSIS — L28.0 LICHENIFICATION: ICD-10-CM

## 2021-12-13 DIAGNOSIS — L30.9 DERMATITIS: ICD-10-CM

## 2021-12-13 DIAGNOSIS — L03.119 CELLULITIS OF AXILLA, UNSPECIFIED LATERALITY: Primary | ICD-10-CM

## 2021-12-13 PROCEDURE — 99214 OFFICE O/P EST MOD 30 MIN: CPT | Performed by: FAMILY MEDICINE

## 2021-12-13 RX ORDER — TRIAMCINOLONE ACETONIDE 1 MG/G
CREAM TOPICAL 2 TIMES DAILY
Qty: 30 G | Refills: 1 | Status: SHIPPED | OUTPATIENT
Start: 2021-12-13 | End: 2022-01-24 | Stop reason: SDUPTHER

## 2021-12-13 RX ORDER — AZITHROMYCIN 250 MG/1
TABLET, FILM COATED ORAL
Qty: 6 TABLET | Refills: 0 | Status: SHIPPED | OUTPATIENT
Start: 2021-12-13 | End: 2021-12-17

## 2021-12-13 RX ORDER — TRIAMCINOLONE ACETONIDE 1 MG/G
CREAM TOPICAL 2 TIMES DAILY
Qty: 30 G | Refills: 1 | Status: SHIPPED | OUTPATIENT
Start: 2021-12-13 | End: 2021-12-13 | Stop reason: SDUPTHER

## 2022-01-24 ENCOUNTER — OFFICE VISIT (OUTPATIENT)
Dept: FAMILY MEDICINE CLINIC | Facility: CLINIC | Age: 42
End: 2022-01-24
Payer: COMMERCIAL

## 2022-01-24 ENCOUNTER — TELEPHONE (OUTPATIENT)
Dept: FAMILY MEDICINE CLINIC | Facility: CLINIC | Age: 42
End: 2022-01-24

## 2022-01-24 VITALS
OXYGEN SATURATION: 99 % | SYSTOLIC BLOOD PRESSURE: 142 MMHG | DIASTOLIC BLOOD PRESSURE: 98 MMHG | TEMPERATURE: 98.6 F | HEIGHT: 65 IN | WEIGHT: 171 LBS | BODY MASS INDEX: 28.49 KG/M2 | HEART RATE: 90 BPM

## 2022-01-24 DIAGNOSIS — R03.0 ELEVATED BLOOD PRESSURE READING IN OFFICE WITHOUT DIAGNOSIS OF HYPERTENSION: ICD-10-CM

## 2022-01-24 DIAGNOSIS — L30.9 DERMATITIS: ICD-10-CM

## 2022-01-24 DIAGNOSIS — R00.2 PALPITATIONS: ICD-10-CM

## 2022-01-24 DIAGNOSIS — Z82.49 FAMILY HISTORY OF ATRIAL FIBRILLATION: ICD-10-CM

## 2022-01-24 DIAGNOSIS — Z88.9 ALLERGY TO CONTACTANT: Primary | ICD-10-CM

## 2022-01-24 PROCEDURE — 3008F BODY MASS INDEX DOCD: CPT | Performed by: FAMILY MEDICINE

## 2022-01-24 PROCEDURE — 1036F TOBACCO NON-USER: CPT | Performed by: FAMILY MEDICINE

## 2022-01-24 PROCEDURE — 99214 OFFICE O/P EST MOD 30 MIN: CPT | Performed by: FAMILY MEDICINE

## 2022-01-24 RX ORDER — TRIAMCINOLONE ACETONIDE 1 MG/G
CREAM TOPICAL 2 TIMES DAILY
Qty: 30 G | Refills: 1 | Status: SHIPPED | OUTPATIENT
Start: 2022-01-24

## 2022-01-24 NOTE — PROGRESS NOTES
Assessment/Plan:         Diagnoses and all orders for this visit:    Allergy to contactant  -     Ambulatory Referral to Allergy; Future    Dermatitis  Comments:  appears to be allergic reaction to deodorant, pt advised to stop use of all deodorant/antiperspirants and allergy eval ordered  Orders:  -     triamcinolone (KENALOG) 0 1 % cream; Apply topically 2 (two) times a day  -     Ambulatory Referral to Allergy; Future    Palpitations  -     ECG 12 lead; Future  -     Holter monitor; Future    Family history of atrial fibrillation  -     ECG 12 lead; Future  -     Holter monitor; Future    Elevated blood pressure reading in office without diagnosis of hypertension  Comments:  bp elevated today- new - pt is anxious and uncomfortable due to rash          Subjective:   Chief Complaint   Patient presents with    Rash     around arm pits both arms since sunday am, Does not want flu vaccine        Patient ID: Jessi Chapin is a 37 y o  female  Here for same day sick appt - reports has rash again in armpits  About a month ago had virtual for the same rash- that went away per pt- "Decided to show up again yesterday- ran out of that (rx) steroid cream"   She states she did recently switch from Secret to an "all-natural" OTC deodorant "[employer]" brand  Also reports c/o palpitations- more frequent last 6 months to a year- episodes occur anytime, but has not noticed when doing any strenuous exercise - states her mother has Afib  No CP, SOB, dizziness or any assoc signs/sx    Rash  This is a recurrent problem  The current episode started today  The problem has been gradually worsening since onset  The affected locations include the left axilla and right axilla  The rash is characterized by burning, dryness, redness, swelling and itchiness  She was exposed to nothing   Pertinent negatives include no anorexia, congestion, diarrhea, eye pain, facial edema, fatigue, fever, joint pain, nail changes, rhinorrhea, shortness of breath, sore throat or vomiting  The following portions of the patient's history were reviewed and updated as appropriate: allergies, current medications, past family history, past medical history, past social history, past surgical history and problem list     Review of Systems   Constitutional: Negative for fatigue and fever  HENT: Negative for congestion, rhinorrhea and sore throat  Eyes: Negative for pain  Respiratory: Negative for shortness of breath  Gastrointestinal: Negative for anorexia, diarrhea and vomiting  Musculoskeletal: Negative for joint pain  Skin: Positive for rash  Negative for nail changes  Objective:      /98 (BP Location: Left arm, Patient Position: Sitting, Cuff Size: Standard)   Pulse 90   Temp 98 6 °F (37 °C) (Tympanic)   Ht 5' 4 5" (1 638 m)   Wt 77 6 kg (171 lb)   SpO2 99%   BMI 28 90 kg/m²          Physical Exam  Constitutional:       General: She is not in acute distress  Appearance: She is well-developed  She is not ill-appearing, toxic-appearing or diaphoretic  HENT:      Head: Normocephalic and atraumatic  Mouth/Throat:      Pharynx: Uvula midline  Eyes:      General: Lids are normal       Conjunctiva/sclera: Conjunctivae normal    Neck:      Thyroid: No thyroid mass, thyromegaly or thyroid tenderness  Vascular: No JVD  Trachea: Phonation normal    Cardiovascular:      Rate and Rhythm: Normal rate and regular rhythm  Pulses: Normal pulses  Heart sounds: Normal heart sounds  Pulmonary:      Effort: Pulmonary effort is normal       Breath sounds: Normal breath sounds  Chest:   Breasts:      Right: No supraclavicular adenopathy  Left: No supraclavicular adenopathy  Musculoskeletal:      Cervical back: Neck supple  Right lower leg: No edema  Left lower leg: No edema  Lymphadenopathy:      Cervical: No cervical adenopathy        Upper Body:      Right upper body: No supraclavicular adenopathy  Left upper body: No supraclavicular adenopathy  Skin:     General: Skin is warm and dry  Coloration: Skin is not pale  Neurological:      Mental Status: She is alert and oriented to person, place, and time  Psychiatric:         Mood and Affect: Mood normal          Behavior: Behavior is cooperative

## 2022-01-24 NOTE — TELEPHONE ENCOUNTER
----- Message from Samantha Roads sent at 1/24/2022 10:58 AM EST -----  Regarding: Deodorant update   The deodorant [place] as per our conversation this morning was incorrect  The labels were similar  The one I have been using is Native deodorant  Says paraben and aluminum free

## 2022-02-03 ENCOUNTER — APPOINTMENT (OUTPATIENT)
Dept: LAB | Facility: HOSPITAL | Age: 42
End: 2022-02-03
Payer: COMMERCIAL

## 2022-02-03 ENCOUNTER — HOSPITAL ENCOUNTER (OUTPATIENT)
Dept: NON INVASIVE DIAGNOSTICS | Facility: HOSPITAL | Age: 42
Discharge: HOME | End: 2022-02-03
Payer: COMMERCIAL

## 2022-02-03 DIAGNOSIS — Z82.49 FAMILY HISTORY OF ATRIAL FIBRILLATION: ICD-10-CM

## 2022-02-03 DIAGNOSIS — R00.2 PALPITATIONS: ICD-10-CM

## 2022-02-03 LAB
ATRIAL RATE: 72 BPM
P AXIS: 123 DEGREES
PR INTERVAL: 170 MS
QRS AXIS: 166 DEGREES
QRSD INTERVAL: 74 MS
QT INTERVAL: 406 MS
QTC INTERVAL: 444 MS
T WAVE AXIS: 157 DEGREES
VENTRICULAR RATE: 72 BPM

## 2022-02-03 PROCEDURE — 93226 XTRNL ECG REC<48 HR SCAN A/R: CPT

## 2022-02-03 PROCEDURE — 93010 ELECTROCARDIOGRAM REPORT: CPT | Performed by: INTERNAL MEDICINE

## 2022-02-03 PROCEDURE — 93005 ELECTROCARDIOGRAM TRACING: CPT

## 2022-02-03 PROCEDURE — 93225 XTRNL ECG REC<48 HRS REC: CPT

## 2022-02-22 PROCEDURE — 93227 XTRNL ECG REC<48 HR R&I: CPT | Performed by: INTERNAL MEDICINE

## 2022-05-06 ENCOUNTER — APPOINTMENT (OUTPATIENT)
Dept: LAB | Facility: CLINIC | Age: 42
End: 2022-05-06

## 2022-05-06 ENCOUNTER — OCCMED (OUTPATIENT)
Dept: URGENT CARE | Facility: CLINIC | Age: 42
End: 2022-05-06

## 2022-05-06 DIAGNOSIS — Z02.1 PRE-EMPLOYMENT HEALTH SCREENING EXAMINATION: ICD-10-CM

## 2022-05-06 DIAGNOSIS — Z02.1 PRE-EMPLOYMENT HEALTH SCREENING EXAMINATION: Primary | ICD-10-CM

## 2022-05-06 PROCEDURE — 86480 TB TEST CELL IMMUN MEASURE: CPT

## 2022-05-06 PROCEDURE — 36415 COLL VENOUS BLD VENIPUNCTURE: CPT

## 2022-05-10 LAB
GAMMA INTERFERON BACKGROUND BLD IA-ACNC: 0.03 IU/ML
M TB IFN-G BLD-IMP: NEGATIVE
M TB IFN-G CD4+ BCKGRND COR BLD-ACNC: -0.01 IU/ML
M TB IFN-G CD4+ BCKGRND COR BLD-ACNC: -0.01 IU/ML
MITOGEN IGNF BCKGRD COR BLD-ACNC: >10 IU/ML

## 2022-06-06 ENCOUNTER — LAB REQUISITION (OUTPATIENT)
Dept: LAB | Facility: HOSPITAL | Age: 42
End: 2022-06-06

## 2022-06-06 DIAGNOSIS — Z11.59 ENCOUNTER FOR SCREENING FOR OTHER VIRAL DISEASES: ICD-10-CM

## 2022-06-06 PROCEDURE — U0003 INFECTIOUS AGENT DETECTION BY NUCLEIC ACID (DNA OR RNA); SEVERE ACUTE RESPIRATORY SYNDROME CORONAVIRUS 2 (SARS-COV-2) (CORONAVIRUS DISEASE [COVID-19]), AMPLIFIED PROBE TECHNIQUE, MAKING USE OF HIGH THROUGHPUT TECHNOLOGIES AS DESCRIBED BY CMS-2020-01-R: HCPCS | Performed by: INTERNAL MEDICINE

## 2022-06-06 PROCEDURE — U0005 INFEC AGEN DETEC AMPLI PROBE: HCPCS | Performed by: INTERNAL MEDICINE

## 2022-06-07 LAB — SARS-COV-2 RNA RESP QL NAA+PROBE: NEGATIVE

## 2022-06-14 ENCOUNTER — LAB REQUISITION (OUTPATIENT)
Dept: LAB | Facility: HOSPITAL | Age: 42
End: 2022-06-14

## 2022-06-14 DIAGNOSIS — Z11.59 ENCOUNTER FOR SCREENING FOR OTHER VIRAL DISEASES: ICD-10-CM

## 2022-06-14 PROCEDURE — U0003 INFECTIOUS AGENT DETECTION BY NUCLEIC ACID (DNA OR RNA); SEVERE ACUTE RESPIRATORY SYNDROME CORONAVIRUS 2 (SARS-COV-2) (CORONAVIRUS DISEASE [COVID-19]), AMPLIFIED PROBE TECHNIQUE, MAKING USE OF HIGH THROUGHPUT TECHNOLOGIES AS DESCRIBED BY CMS-2020-01-R: HCPCS | Performed by: INTERNAL MEDICINE

## 2022-06-14 PROCEDURE — U0005 INFEC AGEN DETEC AMPLI PROBE: HCPCS | Performed by: INTERNAL MEDICINE

## 2022-06-15 LAB — SARS-COV-2 RNA RESP QL NAA+PROBE: NEGATIVE

## 2022-06-17 ENCOUNTER — LAB REQUISITION (OUTPATIENT)
Dept: LAB | Facility: HOSPITAL | Age: 42
End: 2022-06-17

## 2022-06-17 DIAGNOSIS — Z11.59 ENCOUNTER FOR SCREENING FOR OTHER VIRAL DISEASES: ICD-10-CM

## 2022-06-17 PROCEDURE — U0005 INFEC AGEN DETEC AMPLI PROBE: HCPCS | Performed by: INTERNAL MEDICINE

## 2022-06-17 PROCEDURE — U0003 INFECTIOUS AGENT DETECTION BY NUCLEIC ACID (DNA OR RNA); SEVERE ACUTE RESPIRATORY SYNDROME CORONAVIRUS 2 (SARS-COV-2) (CORONAVIRUS DISEASE [COVID-19]), AMPLIFIED PROBE TECHNIQUE, MAKING USE OF HIGH THROUGHPUT TECHNOLOGIES AS DESCRIBED BY CMS-2020-01-R: HCPCS | Performed by: INTERNAL MEDICINE

## 2022-06-18 LAB — SARS-COV-2 RNA RESP QL NAA+PROBE: NEGATIVE

## 2022-07-15 ENCOUNTER — VBI (OUTPATIENT)
Dept: ADMINISTRATIVE | Facility: OTHER | Age: 42
End: 2022-07-15

## 2022-07-15 NOTE — TELEPHONE ENCOUNTER
07/15/22 10:30 AM     See documentation in the VB [employer] SmartForm       Jyothi Neville, [address] [facility] Carla Zayas

## 2022-08-10 ENCOUNTER — OFFICE VISIT (OUTPATIENT)
Dept: OBGYN CLINIC | Facility: CLINIC | Age: 42
End: 2022-08-10
Payer: COMMERCIAL

## 2022-08-10 VITALS
SYSTOLIC BLOOD PRESSURE: 120 MMHG | BODY MASS INDEX: 27.82 KG/M2 | DIASTOLIC BLOOD PRESSURE: 68 MMHG | HEIGHT: 65 IN | WEIGHT: 167 LBS

## 2022-08-10 DIAGNOSIS — Z01.419 WOMEN'S ANNUAL ROUTINE GYNECOLOGICAL EXAMINATION: Primary | ICD-10-CM

## 2022-08-10 DIAGNOSIS — M54.50 LOW BACK PAIN RADIATING TO LEFT LOWER EXTREMITY: ICD-10-CM

## 2022-08-10 DIAGNOSIS — N39.3 STRESS INCONTINENCE IN FEMALE: ICD-10-CM

## 2022-08-10 DIAGNOSIS — M79.605 LOW BACK PAIN RADIATING TO LEFT LOWER EXTREMITY: ICD-10-CM

## 2022-08-10 PROCEDURE — 0503F POSTPARTUM CARE VISIT: CPT | Performed by: STUDENT IN AN ORGANIZED HEALTH CARE EDUCATION/TRAINING PROGRAM

## 2022-08-10 PROCEDURE — 99396 PREV VISIT EST AGE 40-64: CPT | Performed by: STUDENT IN AN ORGANIZED HEALTH CARE EDUCATION/TRAINING PROGRAM

## 2022-08-10 NOTE — PROGRESS NOTES
[facility]   Annual Well Woman Exam  [place]    ASSESSMENT & PLAN: Carla Velázquez is a 40 y o  Rayma Becky  with normal gynecologic exam     1   Routine well woman exam done today  2   Pap and HPV: Pap with HPV was done today  Current ASCCP Guidelines reviewed  3   Mammogram ordered  Recommend yearly mammography per ACOG guidelines  4   The patient declined STD testing--stable, monogamous relationship  5  The patient is sexually active  She is using sterilization for contraception  6  The following were reviewed in today's visit: breast self exam, adequate intake of calcium and vitamin D, exercise, healthy diet and TIFFANY, dyspareunia  TIFFANY--given significant progression over the past couple of years recommend Urogynecology referral, may have some benefit from pelvic PT   Dyspareunia--reviewed moisturizers/lubricants, some benefit to pelvic PT, follow-up PRN  7  Patient to return to office in 12 months for annual, sooner PRN  All questions answered to the best of my ability  Subjective:    CC:  Annual Gynecologic Examination    HPI: Carla Velázquez is a 40 y o  No obstetric history on file  who presents for annual gynecologic examination  She has the following concerns:  TIFFANY, pelvic pain    GYN  Complaints: denies  Denies irregular/heavy menses and vulvar/vaginal symptoms  Menstrual cycles are  regular, occurring every 28-30 days and lasting 7 days  1-3 days of menorrhagia, mild dysmenorrhea  She does not report hot flashes, night sweats, other symptoms of menopause    Sexually active: yes, with   Dyspareunia with deep penetration  Feels like dysuria after sex--improved with voiding and cranberry juice--none today    Birth control: sterilization  Hx STI: denies   Last Pap: unsure, all normal    OB   (4xSVDs)    G/U  Complaints: significant TIFFANY, progressively worse, no splinting for urination or BMs  Denies hematuria and dysuria    Breast  Complaints: denies  Denies: breast lump, breast tenderness, nipple discharge and skin color change  Family hx: denies fhx of breast, uterine, ovarian, or colon cancers  Patient does do regular self-exams    Health Maintenance:    She does follow with a PCP  She exercises: walks all day at work    She feels safe at home, no domestic violence  She is working on improving her diet---well balanced diet, with calcium, multivitamin  She does not use tobacco     History reviewed  No pertinent past medical history  Past Surgical History:   Procedure Laterality Date    TUBAL LIGATION         Past OB/Gyn History:     Patient's last menstrual period was 08/06/2022 (exact date)  Family History:  Family History   Problem Relation Age of Onset    Stroke Maternal Grandmother     Diabetes Maternal Grandmother     Thyroid disease Mother         Hypo       Social History:  Social History     Socioeconomic History    Marital status: /Civil Union     Spouse name: Not on file    Number of children: Not on file    Years of education: Not on file    Highest education level: Not on file   Occupational History    Not on file   Tobacco Use    Smoking status: Never Smoker    Smokeless tobacco: Never Used   Vaping Use    Vaping Use: Never used   Substance and Sexual Activity    Alcohol use:  Yes     Alcohol/week: 2 0 standard drinks     Types: 2 Glasses of wine per week     Comment: occasionally     Drug use: Never    Sexual activity: Yes     Partners: Male     Birth control/protection: Other     Comment: Tubal ligation   Other Topics Concern    Not on file   Social History Narrative    Not on file     Social Determinants of Health     Financial Resource Strain: Not on file   Food Insecurity: Not on file   Transportation Needs: Not on file   Physical Activity: Not on file   Stress: Not on file   Social Connections: Not on file   Intimate Partner Violence: Not on file   Housing Stability: Not on file     No Known Allergies    Current Outpatient Medications:     Biotin 5000 MCG CAPS, Take 1 capsule by mouth daily, Disp: , Rfl:     triamcinolone (KENALOG) 0 1 % cream, Apply topically 2 (two) times a day, Disp: 30 g, Rfl: 1    Review of Systems:  Denies fevers, chills, unintentional weight loss, excessive fatigue, chest pain, shortness of breath, abdominal pain, nausea, vomiting, urinary incontinence, urinary frequency, vaginal bleeding, vaginal discharge  All other systems negative unless otherwise stated  Physical Exam:  /68 (BP Location: Left arm, Patient Position: Sitting, Cuff Size: Standard)   Ht 5' 4 5" (1 638 m)   Wt 75 8 kg (167 lb)   LMP 08/06/2022 (Exact Date)   BMI 28 22 kg/m²  Body mass index is 28 22 kg/m²  GEN: The patient was alert and oriented x3, pleasant well-appearing female in no acute distress  HEENT:  Unremarkable, no anterior or posterior lymphadenopathy, no thyromegaly  CV:  Regular rate and rhythm, normal S1 and S2, no murmurs  RESP:  Clear to auscultation bilaterally, no wheezes, rales or rhonchi  BREAST:  Symmetric breasts with no palpable breast masses or obvious breast lesions  She has no retractions or nipple discharge  She has no axillary abnormalities or palpable masses  GI:  Soft, nontender, non-distended  MSK: bilateral lower extremities are nontender, no edema  : Normal appearing external female genitalia, normal appearing urethral meatus  On sterile speculum exam, normal appearing vaginal epithelium, no vaginal discharge, no bleeding, grossly normal appearing cervix  On bimanual exam, no cervical motion tenderness; uterus is smooth, mobile, non-tender, normal size  No tenderness or fullness in the bilateral adnexa   Mild cystocele, no uterine prolapse or rectal prolapse, negative cough stress test

## 2022-08-17 LAB
CYTOLOGIST CVX/VAG CYTO: ABNORMAL
DX ICD CODE: ABNORMAL
HPV I/H RISK 4 DNA CVX QL PROBE+SIG AMP: POSITIVE
HPV16 DNA CVX QL PROBE+SIG AMP: NEGATIVE
HPV18+45 E6+E7 MRNA CVX QL NAA+PROBE: NEGATIVE
OTHER STN SPEC: ABNORMAL
PATH REPORT.FINAL DX SPEC: ABNORMAL
SL AMB NOTE:: ABNORMAL
SL AMB SPECIMEN ADEQUACY: ABNORMAL
SL AMB TEST METHODOLOGY: ABNORMAL

## 2022-08-19 ENCOUNTER — EVALUATION (OUTPATIENT)
Dept: PHYSICAL THERAPY | Facility: CLINIC | Age: 42
End: 2022-08-19
Payer: COMMERCIAL

## 2022-08-19 DIAGNOSIS — M54.50 LOW BACK PAIN RADIATING TO LEFT LOWER EXTREMITY: ICD-10-CM

## 2022-08-19 DIAGNOSIS — M79.605 LOW BACK PAIN RADIATING TO LEFT LOWER EXTREMITY: ICD-10-CM

## 2022-08-19 PROCEDURE — 97161 PT EVAL LOW COMPLEX 20 MIN: CPT | Performed by: PHYSICAL THERAPIST

## 2022-08-19 NOTE — PROGRESS NOTES
PT Evaluation     Today's date: 2022  Patient name: Elkin Gay  : 1980  MRN: [id number]  Referring provider: Bertha Rivera,*  Dx:   Encounter Diagnosis     ICD-10-CM    1  Low back pain radiating to left lower extremity  M54 [id number] Ambulatory Referral to Physical Therapy    [id number]                   Assessment  Assessment details: Elkin Gay is a pleasant 40 y o  female presents with L sided LBP with L sided sciatica  Significant psoas restriction on L with reproduction of chief complaint on palpation  Improved mobility post mobilization and centralized on standing  Educated on POC in supine 90/90 and light stretching for home  No further referral appears necessary at this time  Skilled PT services appropriate to facilitate return to prior level of function with transition to home exercise program for independent management when appropriate  Impairments: abnormal muscle firing, abnormal muscle tone, abnormal or restricted ROM, impaired physical strength, lacks appropriate home exercise program and pain with function  Understanding of Dx/Px/POC: good   Prognosis: good    Goals  Patient will be able to manage symptoms independently  LT weeks  1  pt will reach foto predicted  2  pt will decrease worst pain 75%   ST weeks  1  Pt will decrease worst pain 50%  2   Patient will successfully manage HEP      Plan  Patient would benefit from: skilled PT  Referral necessary: No  Planned modality interventions: thermotherapy: hydrocollator packs  Planned therapy interventions: home exercise program, manual therapy, neuromuscular re-education, patient education, functional ROM exercises, strengthening, stretching, joint mobilization, graded activity, graded exercise, therapeutic exercise, body mechanics training, motor coordination training and activity modification  Frequency: 2x week  Duration in weeks: 12  Treatment plan discussed with: patient        Subjective Evaluation    History of Present Illness  Mechanism of injury: Pt left sided LBP with L sided sciatica that travels in posterior hip/thigh, traveling to dorsal foot/ great toe  Pain has been intermittent for the last 6 months  Pain tends to be more irritable at work and it has been worsening significantly in the last week  She notes releif with supine 90/90 position and return of pain when standing  Pt denies bowel changes and does have stress incontinence for 11 years but no recent changes with this  Pt denies hx of cancer  When walking, she feels like the R leg has shortened over the last 2 months  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 work shift  Pain  Current pain ratin  At best pain ratin  At worst pain ratin    Patient Goals  Patient goals for therapy: increased strength, increased motion, decreased pain and return to sport/leisure activities          Objective     Active Range of Motion     Additional Active Range of Motion Details  Lumbar:  Flexion: 60% limited, peripheralizes  Extension: 25% limited, peripheralizes  B/l rotation: 25% limited local LBP    Pelvic depression: more limited L compared to R  Reproduction of L sxs with R depression    Strength/Myotome Testing     Left Hip   Planes of Motion   Flexion: 5    Right Hip   Planes of Motion   Flexion: 5    Left Knee   Flexion: 5  Extension: 5    Right Knee   Flexion: 5  Extension: 5    Left Ankle/Foot   Dorsiflexion: 4  Plantar flexion: 5  Great toe extension: 5    Right Ankle/Foot   Dorsiflexion: 5  Plantar flexion: 5  Great toe extension: 5    Tests     Left Hip   Negative QUIRINO and FADIR  Right Hip   Negative QUIRINO and FADIR       General Comments:      Lumbar Comments  Severe L psoas restriction with peripheralizing on palpation, improved post mobilization    +L PSLR             Precautions: none      Manuals             L psoas release CB                                                   Neuro Re-Ed PPT reviewed                                                                                          Ther Ex             Hamstring active elongation reviewed            BKFO reviewed                                                                                          Ther Activity                                       Gait Training                                       Modalities

## 2022-08-23 ENCOUNTER — OFFICE VISIT (OUTPATIENT)
Dept: PHYSICAL THERAPY | Facility: CLINIC | Age: 42
End: 2022-08-23
Payer: COMMERCIAL

## 2022-08-23 DIAGNOSIS — M54.50 LOW BACK PAIN RADIATING TO LEFT LOWER EXTREMITY: Primary | ICD-10-CM

## 2022-08-23 DIAGNOSIS — M79.605 LOW BACK PAIN RADIATING TO LEFT LOWER EXTREMITY: Primary | ICD-10-CM

## 2022-08-23 PROCEDURE — 97140 MANUAL THERAPY 1/> REGIONS: CPT

## 2022-08-23 PROCEDURE — 97110 THERAPEUTIC EXERCISES: CPT

## 2022-08-23 PROCEDURE — 97112 NEUROMUSCULAR REEDUCATION: CPT

## 2022-08-23 NOTE — PROGRESS NOTES
Daily Note     Today's date: 2022  Patient name: Sheba Osman  : 1980  MRN: [id number]  Referring provider: Deann Cevallos,*  Dx:   Encounter Diagnosis     ICD-10-CM    1  Low back pain radiating to left lower extremity  M54 50     M59 331                   Subjective: Pt reports feeling a little better since 1st visit and sleeping better with positioning  Objective: See treatment diary below  Assessment: Tolerated treatment well  Moderate TTP/soft tissue restriction noted with manual therapy left psoas  Mild left LE paresthesias reported post exercise  Decrease symptoms reported with supine 90/90 positioning  Patient would benefit from continued PT      Plan: Continue per plan of care        Precautions: none      Manuals            L psoas release CB GH                                                  Neuro Re-Ed             PPT reviewed 10"x  10                                                                                         Ther Ex             Hamstring active elongation reviewed 10"x5 with ankle pump           BKFO reviewed 10"x  10                                                                                         Ther Activity                                       Gait Training                                       Modalities             MH to lumbar region supine 90/90  10'

## 2022-08-26 ENCOUNTER — APPOINTMENT (OUTPATIENT)
Dept: PHYSICAL THERAPY | Facility: CLINIC | Age: 42
End: 2022-08-26
Payer: COMMERCIAL

## 2022-08-30 ENCOUNTER — OFFICE VISIT (OUTPATIENT)
Dept: PHYSICAL THERAPY | Facility: CLINIC | Age: 42
End: 2022-08-30
Payer: COMMERCIAL

## 2022-08-30 DIAGNOSIS — M54.50 LOW BACK PAIN RADIATING TO LEFT LOWER EXTREMITY: Primary | ICD-10-CM

## 2022-08-30 DIAGNOSIS — M79.605 LOW BACK PAIN RADIATING TO LEFT LOWER EXTREMITY: Primary | ICD-10-CM

## 2022-08-30 PROCEDURE — 97140 MANUAL THERAPY 1/> REGIONS: CPT

## 2022-08-30 PROCEDURE — 97112 NEUROMUSCULAR REEDUCATION: CPT

## 2022-08-30 PROCEDURE — 97110 THERAPEUTIC EXERCISES: CPT

## 2022-09-01 ENCOUNTER — HOSPITAL ENCOUNTER (OUTPATIENT)
Dept: MAMMOGRAPHY | Facility: HOSPITAL | Age: 42
End: 2022-09-01
Payer: COMMERCIAL

## 2022-09-01 VITALS — WEIGHT: 167 LBS | HEIGHT: 65 IN | BODY MASS INDEX: 27.82 KG/M2

## 2022-09-01 DIAGNOSIS — Z01.419 WOMEN'S ANNUAL ROUTINE GYNECOLOGICAL EXAMINATION: ICD-10-CM

## 2022-09-01 PROCEDURE — 77067 SCR MAMMO BI INCL CAD: CPT

## 2022-09-01 PROCEDURE — 77063 BREAST TOMOSYNTHESIS BI: CPT

## 2022-09-02 ENCOUNTER — OFFICE VISIT (OUTPATIENT)
Dept: PHYSICAL THERAPY | Facility: CLINIC | Age: 42
End: 2022-09-02
Payer: COMMERCIAL

## 2022-09-02 DIAGNOSIS — M54.50 LOW BACK PAIN RADIATING TO LEFT LOWER EXTREMITY: Primary | ICD-10-CM

## 2022-09-02 DIAGNOSIS — M79.605 LOW BACK PAIN RADIATING TO LEFT LOWER EXTREMITY: Primary | ICD-10-CM

## 2022-09-02 PROCEDURE — 97110 THERAPEUTIC EXERCISES: CPT

## 2022-09-02 PROCEDURE — 97112 NEUROMUSCULAR REEDUCATION: CPT

## 2022-09-02 PROCEDURE — 97140 MANUAL THERAPY 1/> REGIONS: CPT

## 2022-09-02 NOTE — PROGRESS NOTES
Daily Note     Today's date: 2022  Patient name: Carla Velázquez  : 1980  MRN: [id number]  Referring provider: Yovani Franklin,*  Dx:   Encounter Diagnosis     ICD-10-CM    1  Low back pain radiating to left lower extremity  M54 50     M79 605                   Subjective: Pt cont's to c/o left sided low back and LE pain that increases with standing/walking, sitting/driving and work activity  Pt reports feeling better after last visit and with added piriformis stretch at home  Objective: See treatment diary below  Assessment: Tolerated treatment well  Moderate TTP/soft tissue restriction noted with manual therapy left psoas, lumbar and piriformis regions  Pt cont's to report decrease symptoms with supine 90/90, PPT and piriformis stretches  Good tolerance to added gentle core stabilization with verbal/tactile cues provided for proper technique  Left foot paresthesias reported with STM to left lumbar/piriformis region  Patient would benefit from continued PT      Plan: Continue per plan of care        Precautions: none      Manuals  9/         L psoas release CB GH GH GH         STM L lumbar piriformis    GH                                   Neuro Re-Ed             PPT reviewed 10"x  10 GH GH         Supine iso hip flexion     5"x5 ea                                                                          Ther Ex             Hamstring active elongation reviewed 10"x5 with ankle pump 10"x  10 GH         BKFO reviewed 10"x  10 GH GH         Supine piriformis stretch    30"x3  1 min x2         Standing piriformis stretch    30"x3 L                                                             Ther Activity                                       Gait Training                                       Modalities             MH to lumbar region supine 90/90  10'  10' during ex 10' during ex

## 2022-09-06 ENCOUNTER — APPOINTMENT (OUTPATIENT)
Dept: PHYSICAL THERAPY | Facility: CLINIC | Age: 42
End: 2022-09-06
Payer: COMMERCIAL

## 2022-09-09 ENCOUNTER — OFFICE VISIT (OUTPATIENT)
Dept: PHYSICAL THERAPY | Facility: CLINIC | Age: 42
End: 2022-09-09
Payer: COMMERCIAL

## 2022-09-09 DIAGNOSIS — M54.50 LOW BACK PAIN RADIATING TO LEFT LOWER EXTREMITY: Primary | ICD-10-CM

## 2022-09-09 DIAGNOSIS — M79.605 LOW BACK PAIN RADIATING TO LEFT LOWER EXTREMITY: Primary | ICD-10-CM

## 2022-09-09 PROCEDURE — 97010 HOT OR COLD PACKS THERAPY: CPT | Performed by: PHYSICAL THERAPIST

## 2022-09-09 PROCEDURE — 97110 THERAPEUTIC EXERCISES: CPT | Performed by: PHYSICAL THERAPIST

## 2022-09-09 PROCEDURE — 97140 MANUAL THERAPY 1/> REGIONS: CPT | Performed by: PHYSICAL THERAPIST

## 2022-09-09 PROCEDURE — 97112 NEUROMUSCULAR REEDUCATION: CPT | Performed by: PHYSICAL THERAPIST

## 2022-09-09 NOTE — PROGRESS NOTES
Daily Note     Today's date: 2022  Patient name: Mahogany Rosario  : 1980  MRN: [id number]  Referring provider: Javier Valdez,*  Dx:   Encounter Diagnosis     ICD-10-CM    1  Low back pain radiating to left lower extremity  M54 50     M79 605                   Subjective: Pt notes significantly less pain in the leg after lv  She feels the STM to her LB and posterior hip, although painful at the time, helped her pain greatly  Objective: See treatment diary below      Assessment: Tolerated treatment well  Patient would benefit from continued PT  Continues to peripheralize with lumbar extension and centralize with flexion  Responded well to piriformis work  Moderate increase in sxs with prone lying, resolved quickly in supine 90/90  Plan: Continue per plan of care        Precautions: none      Manuals         L psoas release CB GH GH GH CB        STM L lumbar piriformis    GH CB                                  Neuro Re-Ed             PPT reviewed 10"x  10 GH GH CB        Supine iso hip flexion     5"x5 ea CB                                                                         Ther Ex             Hamstring active elongation reviewed 10"x5 with ankle pump 10"x  10 GH CB         BKFO reviewed 10"x  10 GH GH         Supine piriformis stretch    30"x3  1 min x2 CB        Standing piriformis stretch    30"x3 L CB                                                            Ther Activity                                       Gait Training                                       Modalities             MH to lumbar region supine 90/90  10'  10' during ex 10' during ex 10' during exercise

## 2022-09-13 ENCOUNTER — OFFICE VISIT (OUTPATIENT)
Dept: PHYSICAL THERAPY | Facility: CLINIC | Age: 42
End: 2022-09-13
Payer: COMMERCIAL

## 2022-09-13 DIAGNOSIS — M79.605 LOW BACK PAIN RADIATING TO LEFT LOWER EXTREMITY: Primary | ICD-10-CM

## 2022-09-13 DIAGNOSIS — M54.50 LOW BACK PAIN RADIATING TO LEFT LOWER EXTREMITY: Primary | ICD-10-CM

## 2022-09-13 PROCEDURE — 97110 THERAPEUTIC EXERCISES: CPT | Performed by: PHYSICAL THERAPIST

## 2022-09-13 PROCEDURE — 97010 HOT OR COLD PACKS THERAPY: CPT | Performed by: PHYSICAL THERAPIST

## 2022-09-13 PROCEDURE — 97140 MANUAL THERAPY 1/> REGIONS: CPT | Performed by: PHYSICAL THERAPIST

## 2022-09-13 PROCEDURE — 97112 NEUROMUSCULAR REEDUCATION: CPT | Performed by: PHYSICAL THERAPIST

## 2022-09-13 NOTE — PROGRESS NOTES
Daily Note     Today's date: 2022  Patient name: Petra Mata  : 1980  MRN: [id number]  Referring provider: Sherri Newell,*  Dx:   Encounter Diagnosis     ICD-10-CM    1  Low back pain radiating to left lower extremity  M54 50     M79 369                   Subjective: Pt notes some increased posterior hip pain after lv x1 day  Objective: See treatment diary below      Assessment: Tolerated treatment well  Patient would benefit from continued PT  Continued centralization with flexion bias  Unable to tolerate prone lying with pillows without peripheralizing today  Attempted altering standing posture to more neutral positioning with peripheralization and educated on jsut continuing with prior stretching exercises for the time being as greatest relief is seen with this  Plan: Continue per plan of care        Precautions: none      Manuals        L psoas release CB GH GH GH CB CB       STM L lumbar piriformis    GH CB CB                                 Neuro Re-Ed             PPT reviewed 10"x  10 GH GH CB CB       Supine iso hip flexion     5"x5 ea CB CB                                                                        Ther Ex             Hamstring active elongation reviewed 10"x5 with ankle pump 10"x  10 GH CB  CB       BKFO reviewed 10"x  10 GH GH         Supine piriformis stretch    30"x3  1 min x2 CB CB       Standing piriformis stretch    30"x3 L CB                                                            Ther Activity                                       Gait Training                                       Modalities             MH to lumbar region supine 90/90  10'  10' during ex 10' during ex 10' during exercise CB

## 2022-09-16 ENCOUNTER — APPOINTMENT (OUTPATIENT)
Dept: PHYSICAL THERAPY | Facility: CLINIC | Age: 42
End: 2022-09-16
Payer: COMMERCIAL

## 2022-09-20 ENCOUNTER — OFFICE VISIT (OUTPATIENT)
Dept: PHYSICAL THERAPY | Facility: CLINIC | Age: 42
End: 2022-09-20
Payer: COMMERCIAL

## 2022-09-20 DIAGNOSIS — M54.50 LOW BACK PAIN RADIATING TO LEFT LOWER EXTREMITY: Primary | ICD-10-CM

## 2022-09-20 DIAGNOSIS — M79.605 LOW BACK PAIN RADIATING TO LEFT LOWER EXTREMITY: Primary | ICD-10-CM

## 2022-09-20 PROCEDURE — 97140 MANUAL THERAPY 1/> REGIONS: CPT

## 2022-09-20 PROCEDURE — 97112 NEUROMUSCULAR REEDUCATION: CPT

## 2022-09-20 PROCEDURE — 97110 THERAPEUTIC EXERCISES: CPT

## 2022-09-20 NOTE — PROGRESS NOTES
Daily Note     Today's date: 2022  Patient name: Sarah Moran  : 1980  MRN: [id number]  Referring provider: Will Garner,*  Dx:   Encounter Diagnosis     ICD-10-CM    1  Low back pain radiating to left lower extremity  M54 50     M79 575                   Subjective: Pt cont's to c/o increase left sided low back, buttock and LE pain/paresthesias with sitting, standing, walking activity  Pt states pain level 7/10 at worst; 2/10 at best  Pt c/o increase pain with work activity today  Objective: See treatment diary below      Assessment: Tolerated treatment well  Pt cont's to respond well to supine 90/90 and flexion based exercise with centralization of symptoms and decrease pain reported post treatment  Improved tolerance to manual therapy in right sidelying position today  Patient would benefit from continued PT  Plan: Continue per plan of care        Precautions: none      Manuals       L psoas release CB GH GH GH CB CB GH      STM L lumbar piriformis    GH CB CB S/l lumbar QL and piriformis                                Neuro Re-Ed             PPT reviewed 10"x  10 GH GH CB CB GH      Supine iso hip flexion     5"x5 ea CB CB 5"x10 ea                                                                       Ther Ex             Hamstring active elongation reviewed 10"x5 with ankle pump 10"x  10 GH CB  CB GH      BKFO reviewed 10"x  10 GH GH         Supine piriformis stretch    30"x3  1 min x2 CB CB GH      Standing piriformis stretch    30"x3 L CB                                                            Ther Activity                                       Gait Training                                       Modalities             MH to lumbar region supine 90/90  10'  10' during ex 10' during ex 10' during exercise CB [address]

## 2022-09-23 ENCOUNTER — APPOINTMENT (OUTPATIENT)
Dept: PHYSICAL THERAPY | Facility: CLINIC | Age: 42
End: 2022-09-23
Payer: COMMERCIAL

## 2022-09-27 ENCOUNTER — OFFICE VISIT (OUTPATIENT)
Dept: PHYSICAL THERAPY | Facility: CLINIC | Age: 42
End: 2022-09-27
Payer: COMMERCIAL

## 2022-09-27 DIAGNOSIS — M79.605 LOW BACK PAIN RADIATING TO LEFT LOWER EXTREMITY: Primary | ICD-10-CM

## 2022-09-27 DIAGNOSIS — M54.50 LOW BACK PAIN RADIATING TO LEFT LOWER EXTREMITY: Primary | ICD-10-CM

## 2022-09-27 PROCEDURE — 97112 NEUROMUSCULAR REEDUCATION: CPT

## 2022-09-27 PROCEDURE — 97140 MANUAL THERAPY 1/> REGIONS: CPT

## 2022-09-27 PROCEDURE — 97110 THERAPEUTIC EXERCISES: CPT

## 2022-09-27 NOTE — PROGRESS NOTES
Daily Note     Today's date: 2022  Patient name: Adriana Feliciano  : 1980  MRN: [id number]  Referring provider: Izabela Gay,*  Dx:   Encounter Diagnosis     ICD-10-CM    1  Low back pain radiating to left lower extremity  M54 50     M79 605                   Subjective: Patient reports decreased overall pain levels 0/10  She was off for 3 days last week and got a lot of rest  She was able to complete HEP daily  She returns to work on   Objective: See treatment diary below    EDU on the need to be aware of aggravating factors/correct posture at work since she is pain free  Assessment: Tolerated treatment well  Patient is able to complete session without adverse effects  Favorable response to manuals, focusing on QL, no soft tissue restrictions palpated in piriformis today  Continued PT would be beneficial to improve function            Plan: Continue per plan of care         Precautions: none      Manuals      L psoas release CB GH GH GH CB CB GH MB     STM L lumbar piriformis    GH CB CB S/l lumbar QL and piriformis MB                               Neuro Re-Ed             PPT reviewed 10"x  10 GH GH CB CB GH MB     Supine iso hip flexion     5"x5 ea CB CB 5"x10 ea MB                                                                      Ther Ex             Hamstring active elongation reviewed 10"x5 with ankle pump 10"x  10 GH CB  CB GH MB     BKFO reviewed 10"x  10 GH GH         Supine piriformis stretch    30"x3  1 min x2 CB CB GH MB     Standing piriformis stretch    30"x3 L CB                                                            Ther Activity                                       Gait Training                                       Modalities             MH to lumbar region supine 90/90  10'  10' during ex 10' during ex 10' during exercise CB GH MB

## 2022-09-30 ENCOUNTER — OFFICE VISIT (OUTPATIENT)
Dept: PHYSICAL THERAPY | Facility: CLINIC | Age: 42
End: 2022-09-30
Payer: COMMERCIAL

## 2022-09-30 DIAGNOSIS — M79.605 LOW BACK PAIN RADIATING TO LEFT LOWER EXTREMITY: Primary | ICD-10-CM

## 2022-09-30 DIAGNOSIS — M54.50 LOW BACK PAIN RADIATING TO LEFT LOWER EXTREMITY: Primary | ICD-10-CM

## 2022-09-30 PROCEDURE — 97110 THERAPEUTIC EXERCISES: CPT | Performed by: PHYSICAL THERAPIST

## 2022-09-30 PROCEDURE — 97112 NEUROMUSCULAR REEDUCATION: CPT | Performed by: PHYSICAL THERAPIST

## 2022-09-30 PROCEDURE — 97140 MANUAL THERAPY 1/> REGIONS: CPT | Performed by: PHYSICAL THERAPIST

## 2022-09-30 NOTE — PROGRESS NOTES
Daily Note     Today's date: 2022  Patient name: Mahogany Rosario  : 1980  MRN: [id number]  Referring provider: Javier Valdez,*  Dx:   Encounter Diagnosis     ICD-10-CM    1  Low back pain radiating to left lower extremity  M54 50     M85 037                   Subjective: Pt reports continued improvement in LBP and attributes to decreased workdays recently  Objective: See treatment diary below      Assessment: Tolerated treatment well  Patient would benefit from continued PT  Pt with peripheralized leg pain in standing resolved with standing posture correction  Educated on how to self correct and provided NMRE for home  Nv barring her ability to maintain neutral, progress to functional work activity like pt transfer form and low squat with hip hinge  Plan: Continue per plan of care        Precautions: none      Manuals     L psoas release CB GH GH GH CB CB GH MB     STM L lumbar piriformis    GH CB CB S/l lumbar QL and piriformis MB CB    L SL hip flexor stretch         CB                 Neuro Re-Ed             PPT reviewed 10"x  10 GH GH CB CB GH MB CB    Supine iso hip flexion     5"x5 ea CB CB 5"x10 ea MB CB                                                                     Ther Ex             Hamstring active elongation reviewed 10"x5 with ankle pump 10"x  10 GH CB  CB GH MB CB    BKFO reviewed 10"x  10 GH GH         Supine piriformis stretch    30"x3  1 min x2 CB CB GH MB CB    Standing piriformis stretch    30"x3 L CB                                                            Ther Activity             Standing posture correction         10'                 Gait Training                                       Modalities             MH to lumbar region supine 90/90  10'  10' during ex 10' during ex 10' during exercise CB GH MB   CB

## 2022-10-03 ENCOUNTER — OFFICE VISIT (OUTPATIENT)
Dept: PHYSICAL THERAPY | Facility: CLINIC | Age: 42
End: 2022-10-03
Payer: COMMERCIAL

## 2022-10-03 DIAGNOSIS — M79.605 LOW BACK PAIN RADIATING TO LEFT LOWER EXTREMITY: Primary | ICD-10-CM

## 2022-10-03 DIAGNOSIS — M54.50 LOW BACK PAIN RADIATING TO LEFT LOWER EXTREMITY: Primary | ICD-10-CM

## 2022-10-03 PROCEDURE — 97140 MANUAL THERAPY 1/> REGIONS: CPT

## 2022-10-03 PROCEDURE — 97110 THERAPEUTIC EXERCISES: CPT

## 2022-10-03 PROCEDURE — 97112 NEUROMUSCULAR REEDUCATION: CPT

## 2022-10-03 NOTE — PROGRESS NOTES
Daily Note     Today's date: 10/3/2022  Patient name: Payton Kim  : 1980  MRN: [id number]  Referring provider: Watson Myers,*  Dx:   Encounter Diagnosis     ICD-10-CM    1  Low back pain radiating to left lower extremity  M54 50     M79 605                   Subjective: Pt c/o LBP with return to work today  Pt states pain level 5/10 pre treatment  Pt states she has been trying to be more aware of standing posture  Left LE radicular pain decreased  Objective: See treatment diary below      Assessment: Tolerated treatment well  Pt responded well to manual therapy and exercise with decrease pain reported post treatment  Mild left distal LE paresthesias reported with hip flexor stretch  Patient would benefit from continued PT  Reviewed sitting/standing posture and neutral lumbar spine  Improved postural awareness  Progress to functional work activity as tolerated like pt transfer form and low squat with hip hinge  Plan: Continue per plan of care        Precautions: none      Manuals  10/3   L psoas release CB GH GH GH CB CB GH MB  GH   STM L lumbar piriformis    GH CB CB S/l lumbar QL and piriformis MB CB GH   L SL hip flexor stretch         CB GH                Neuro Re-Ed             PPT reviewed 10"x  10 GH GH CB CB GH MB CB GH   Supine iso hip flexion     5"x5 ea CB CB 5"x10 ea MB CB GH                                                                    Ther Ex             Hamstring active elongation reviewed 10"x5 with ankle pump 10"x  10 GH CB  CB GH MB CB GH   BKFO reviewed 10"x  10 GH GH         Supine piriformis stretch    30"x3  1 min x2 CB CB GH MB CB GH   Standing piriformis stretch    30"x3 L CB                                                            Ther Activity             Standing posture correction         10' reviewed                 Gait Training                                       Modalities              to lumbar region supine 90/90  10'  10' during ex 10' during ex 10' during exercise CB GH MB   CB GH

## 2022-10-07 ENCOUNTER — APPOINTMENT (OUTPATIENT)
Dept: PHYSICAL THERAPY | Facility: CLINIC | Age: 42
End: 2022-10-07

## 2022-10-18 ENCOUNTER — APPOINTMENT (OUTPATIENT)
Dept: PHYSICAL THERAPY | Facility: CLINIC | Age: 42
End: 2022-10-18

## 2022-10-21 ENCOUNTER — APPOINTMENT (OUTPATIENT)
Dept: PHYSICAL THERAPY | Facility: CLINIC | Age: 42
End: 2022-10-21

## 2022-10-25 ENCOUNTER — APPOINTMENT (OUTPATIENT)
Dept: PHYSICAL THERAPY | Facility: CLINIC | Age: 42
End: 2022-10-25

## 2022-10-28 ENCOUNTER — APPOINTMENT (OUTPATIENT)
Dept: PHYSICAL THERAPY | Facility: CLINIC | Age: 42
End: 2022-10-28

## 2023-01-30 ENCOUNTER — APPOINTMENT (EMERGENCY)
Dept: ULTRASOUND IMAGING | Facility: HOSPITAL | Age: 43
End: 2023-01-30

## 2023-01-30 ENCOUNTER — HOSPITAL ENCOUNTER (OUTPATIENT)
Facility: HOSPITAL | Age: 43
Setting detail: OBSERVATION
Discharge: HOME | End: 2023-01-30
Attending: EMERGENCY MEDICINE | Admitting: SPECIALIST

## 2023-01-30 ENCOUNTER — ANESTHESIA EVENT (OUTPATIENT)
Dept: PERIOP | Facility: HOSPITAL | Age: 43
End: 2023-01-30

## 2023-01-30 ENCOUNTER — APPOINTMENT (EMERGENCY)
Dept: CT IMAGING | Facility: HOSPITAL | Age: 43
End: 2023-01-30

## 2023-01-30 ENCOUNTER — ANESTHESIA (OUTPATIENT)
Dept: PERIOP | Facility: HOSPITAL | Age: 43
End: 2023-01-30

## 2023-01-30 VITALS
RESPIRATION RATE: 16 BRPM | DIASTOLIC BLOOD PRESSURE: 55 MMHG | TEMPERATURE: 97.6 F | HEART RATE: 80 BPM | HEIGHT: 63 IN | SYSTOLIC BLOOD PRESSURE: 122 MMHG | WEIGHT: 165 LBS | OXYGEN SATURATION: 98 % | BODY MASS INDEX: 29.23 KG/M2

## 2023-01-30 DIAGNOSIS — K81.9 CHOLECYSTITIS: Primary | ICD-10-CM

## 2023-01-30 DIAGNOSIS — K80.12 CALCULUS OF GALLBLADDER WITH ACUTE ON CHRONIC CHOLECYSTITIS WITHOUT OBSTRUCTION: ICD-10-CM

## 2023-01-30 DIAGNOSIS — K80.50 BILIARY COLIC: ICD-10-CM

## 2023-01-30 PROBLEM — K80.20 CHOLELITHIASIS: Status: ACTIVE | Noted: 2023-01-30

## 2023-01-30 LAB
ALBUMIN SERPL BCP-MCNC: 4.3 G/DL (ref 3.5–5)
ALP SERPL-CCNC: 74 U/L (ref 34–104)
ALT SERPL W P-5'-P-CCNC: 14 U/L (ref 7–52)
ANION GAP SERPL CALCULATED.3IONS-SCNC: 9 MMOL/L (ref 4–13)
AST SERPL W P-5'-P-CCNC: 22 U/L (ref 13–39)
ATRIAL RATE: 63 BPM
ATRIAL RATE: 80 BPM
B-HCG SERPL-ACNC: <1 MIU/ML (ref 0–11.6)
BASOPHILS # BLD AUTO: 0.03 THOUSANDS/ÂΜL (ref 0–0.1)
BASOPHILS NFR BLD AUTO: 0 % (ref 0–1)
BILIRUB SERPL-MCNC: 0.37 MG/DL (ref 0.2–1)
BUN SERPL-MCNC: 10 MG/DL (ref 5–25)
CALCIUM SERPL-MCNC: 9.9 MG/DL (ref 8.4–10.2)
CARDIAC TROPONIN I PNL SERPL HS: <2 NG/L
CARDIAC TROPONIN I PNL SERPL HS: <2 NG/L
CHLORIDE SERPL-SCNC: 104 MMOL/L (ref 96–108)
CO2 SERPL-SCNC: 24 MMOL/L (ref 21–32)
CREAT SERPL-MCNC: 0.89 MG/DL (ref 0.6–1.3)
EOSINOPHIL # BLD AUTO: 0.04 THOUSAND/ÂΜL (ref 0–0.61)
EOSINOPHIL NFR BLD AUTO: 1 % (ref 0–6)
ERYTHROCYTE [DISTWIDTH] IN BLOOD BY AUTOMATED COUNT: 13.2 % (ref 11.6–15.1)
EXT PREGNANCY TEST URINE: NEGATIVE
EXT. CONTROL: NORMAL
GFR SERPL CREATININE-BSD FRML MDRD: 80 ML/MIN/1.73SQ M
GLUCOSE SERPL-MCNC: 102 MG/DL (ref 65–140)
HCT VFR BLD AUTO: 36.8 % (ref 34.8–46.1)
HGB BLD-MCNC: 12.6 G/DL (ref 11.5–15.4)
IMM GRANULOCYTES # BLD AUTO: 0.02 THOUSAND/UL (ref 0–0.2)
IMM GRANULOCYTES NFR BLD AUTO: 0 % (ref 0–2)
LIPASE SERPL-CCNC: 25 U/L (ref 11–82)
LYMPHOCYTES # BLD AUTO: 1.55 THOUSANDS/ÂΜL (ref 0.6–4.47)
LYMPHOCYTES NFR BLD AUTO: 19 % (ref 14–44)
MCH RBC QN AUTO: 31.6 PG (ref 26.8–34.3)
MCHC RBC AUTO-ENTMCNC: 34.2 G/DL (ref 31.4–37.4)
MCV RBC AUTO: 92 FL (ref 82–98)
MONOCYTES # BLD AUTO: 0.75 THOUSAND/ÂΜL (ref 0.17–1.22)
MONOCYTES NFR BLD AUTO: 9 % (ref 4–12)
NEUTROPHILS # BLD AUTO: 5.93 THOUSANDS/ÂΜL (ref 1.85–7.62)
NEUTS SEG NFR BLD AUTO: 71 % (ref 43–75)
NRBC BLD AUTO-RTO: 0 /100 WBCS
P AXIS: 63 DEGREES
P AXIS: 75 DEGREES
PLATELET # BLD AUTO: 409 THOUSANDS/UL (ref 149–390)
PMV BLD AUTO: 9 FL (ref 8.9–12.7)
POTASSIUM SERPL-SCNC: 3.5 MMOL/L (ref 3.5–5.3)
PR INTERVAL: 170 MS
PR INTERVAL: 184 MS
PROT SERPL-MCNC: 7.1 G/DL (ref 6.4–8.4)
QRS AXIS: 60 DEGREES
QRS AXIS: 72 DEGREES
QRSD INTERVAL: 78 MS
QRSD INTERVAL: 80 MS
QT INTERVAL: 396 MS
QT INTERVAL: 424 MS
QTC INTERVAL: 433 MS
QTC INTERVAL: 456 MS
RBC # BLD AUTO: 3.99 MILLION/UL (ref 3.81–5.12)
SODIUM SERPL-SCNC: 137 MMOL/L (ref 135–147)
T WAVE AXIS: 51 DEGREES
T WAVE AXIS: 73 DEGREES
VENTRICULAR RATE: 63 BPM
VENTRICULAR RATE: 80 BPM
WBC # BLD AUTO: 8.32 THOUSAND/UL (ref 4.31–10.16)

## 2023-01-30 RX ORDER — KETOROLAC TROMETHAMINE 30 MG/ML
INJECTION, SOLUTION INTRAMUSCULAR; INTRAVENOUS AS NEEDED
Status: DISCONTINUED | OUTPATIENT
Start: 2023-01-30 | End: 2023-01-30

## 2023-01-30 RX ORDER — SODIUM CHLORIDE, SODIUM LACTATE, POTASSIUM CHLORIDE, CALCIUM CHLORIDE 600; 310; 30; 20 MG/100ML; MG/100ML; MG/100ML; MG/100ML
INJECTION, SOLUTION INTRAVENOUS CONTINUOUS PRN
Status: DISCONTINUED | OUTPATIENT
Start: 2023-01-30 | End: 2023-01-30

## 2023-01-30 RX ORDER — MAGNESIUM HYDROXIDE 1200 MG/15ML
LIQUID ORAL AS NEEDED
Status: DISCONTINUED | OUTPATIENT
Start: 2023-01-30 | End: 2023-01-30 | Stop reason: HOSPADM

## 2023-01-30 RX ORDER — BUPIVACAINE HYDROCHLORIDE AND EPINEPHRINE 2.5; 5 MG/ML; UG/ML
INJECTION, SOLUTION EPIDURAL; INFILTRATION; INTRACAUDAL; PERINEURAL AS NEEDED
Status: DISCONTINUED | OUTPATIENT
Start: 2023-01-30 | End: 2023-01-30 | Stop reason: HOSPADM

## 2023-01-30 RX ORDER — MIDAZOLAM HYDROCHLORIDE 2 MG/2ML
INJECTION, SOLUTION INTRAMUSCULAR; INTRAVENOUS AS NEEDED
Status: DISCONTINUED | OUTPATIENT
Start: 2023-01-30 | End: 2023-01-30

## 2023-01-30 RX ORDER — SODIUM CHLORIDE, SODIUM GLUCONATE, SODIUM ACETATE, POTASSIUM CHLORIDE, MAGNESIUM CHLORIDE, SODIUM PHOSPHATE, DIBASIC, AND POTASSIUM PHOSPHATE .53; .5; .37; .037; .03; .012; .00082 G/100ML; G/100ML; G/100ML; G/100ML; G/100ML; G/100ML; G/100ML
125 INJECTION, SOLUTION INTRAVENOUS CONTINUOUS
Status: DISCONTINUED | OUTPATIENT
Start: 2023-01-30 | End: 2023-01-31 | Stop reason: HOSPADM

## 2023-01-30 RX ORDER — FENTANYL CITRATE/PF 50 MCG/ML
25 SYRINGE (ML) INJECTION
Status: DISCONTINUED | OUTPATIENT
Start: 2023-01-30 | End: 2023-01-30 | Stop reason: HOSPADM

## 2023-01-30 RX ORDER — HEPARIN SODIUM 5000 [USP'U]/ML
5000 INJECTION, SOLUTION INTRAVENOUS; SUBCUTANEOUS EVERY 8 HOURS SCHEDULED
Status: DISCONTINUED | OUTPATIENT
Start: 2023-01-30 | End: 2023-01-31 | Stop reason: HOSPADM

## 2023-01-30 RX ORDER — ROCURONIUM BROMIDE 10 MG/ML
INJECTION, SOLUTION INTRAVENOUS AS NEEDED
Status: DISCONTINUED | OUTPATIENT
Start: 2023-01-30 | End: 2023-01-30

## 2023-01-30 RX ORDER — SODIUM CHLORIDE, SODIUM LACTATE, POTASSIUM CHLORIDE, CALCIUM CHLORIDE 600; 310; 30; 20 MG/100ML; MG/100ML; MG/100ML; MG/100ML
20 INJECTION, SOLUTION INTRAVENOUS CONTINUOUS
Status: DISCONTINUED | OUTPATIENT
Start: 2023-01-30 | End: 2023-01-31 | Stop reason: HOSPADM

## 2023-01-30 RX ORDER — ONDANSETRON 2 MG/ML
4 INJECTION INTRAMUSCULAR; INTRAVENOUS EVERY 6 HOURS PRN
Status: DISCONTINUED | OUTPATIENT
Start: 2023-01-30 | End: 2023-01-31 | Stop reason: HOSPADM

## 2023-01-30 RX ORDER — OXYCODONE HYDROCHLORIDE AND ACETAMINOPHEN 5; 325 MG/1; MG/1
1 TABLET ORAL EVERY 4 HOURS PRN
Status: DISCONTINUED | OUTPATIENT
Start: 2023-01-30 | End: 2023-01-31 | Stop reason: HOSPADM

## 2023-01-30 RX ORDER — CEFAZOLIN SODIUM 2 G/50ML
2000 SOLUTION INTRAVENOUS
Status: DISCONTINUED | OUTPATIENT
Start: 2023-01-31 | End: 2023-01-31 | Stop reason: HOSPADM

## 2023-01-30 RX ORDER — FENTANYL CITRATE 50 UG/ML
INJECTION, SOLUTION INTRAMUSCULAR; INTRAVENOUS AS NEEDED
Status: DISCONTINUED | OUTPATIENT
Start: 2023-01-30 | End: 2023-01-30

## 2023-01-30 RX ORDER — ONDANSETRON 2 MG/ML
4 INJECTION INTRAMUSCULAR; INTRAVENOUS ONCE AS NEEDED
Status: COMPLETED | OUTPATIENT
Start: 2023-01-30 | End: 2023-01-30

## 2023-01-30 RX ORDER — ACETAMINOPHEN 325 MG/1
975 TABLET ORAL ONCE
Status: COMPLETED | OUTPATIENT
Start: 2023-01-30 | End: 2023-01-30

## 2023-01-30 RX ORDER — DEXAMETHASONE SODIUM PHOSPHATE 10 MG/ML
INJECTION, SOLUTION INTRAMUSCULAR; INTRAVENOUS AS NEEDED
Status: DISCONTINUED | OUTPATIENT
Start: 2023-01-30 | End: 2023-01-30

## 2023-01-30 RX ORDER — SODIUM CHLORIDE, SODIUM LACTATE, POTASSIUM CHLORIDE, CALCIUM CHLORIDE 600; 310; 30; 20 MG/100ML; MG/100ML; MG/100ML; MG/100ML
125 INJECTION, SOLUTION INTRAVENOUS CONTINUOUS
Status: DISCONTINUED | OUTPATIENT
Start: 2023-01-30 | End: 2023-01-31 | Stop reason: HOSPADM

## 2023-01-30 RX ORDER — DEXMEDETOMIDINE HYDROCHLORIDE 100 UG/ML
INJECTION, SOLUTION INTRAVENOUS AS NEEDED
Status: DISCONTINUED | OUTPATIENT
Start: 2023-01-30 | End: 2023-01-30

## 2023-01-30 RX ORDER — PROPOFOL 10 MG/ML
INJECTION, EMULSION INTRAVENOUS AS NEEDED
Status: DISCONTINUED | OUTPATIENT
Start: 2023-01-30 | End: 2023-01-30

## 2023-01-30 RX ORDER — OXYCODONE HYDROCHLORIDE AND ACETAMINOPHEN 5; 325 MG/1; MG/1
1 TABLET ORAL EVERY 4 HOURS PRN
Qty: 12 TABLET | Refills: 0 | Status: SHIPPED | OUTPATIENT
Start: 2023-01-30 | End: 2023-02-08 | Stop reason: ALTCHOICE

## 2023-01-30 RX ORDER — CEFAZOLIN SODIUM 1 G/3ML
INJECTION, POWDER, FOR SOLUTION INTRAMUSCULAR; INTRAVENOUS AS NEEDED
Status: DISCONTINUED | OUTPATIENT
Start: 2023-01-30 | End: 2023-01-30

## 2023-01-30 RX ORDER — ONDANSETRON 2 MG/ML
INJECTION INTRAMUSCULAR; INTRAVENOUS AS NEEDED
Status: DISCONTINUED | OUTPATIENT
Start: 2023-01-30 | End: 2023-01-30

## 2023-01-30 RX ADMIN — KETOROLAC TROMETHAMINE 15 MG: 30 INJECTION, SOLUTION INTRAMUSCULAR at 19:54

## 2023-01-30 RX ADMIN — PROPOFOL 200 MG: 10 INJECTION, EMULSION INTRAVENOUS at 18:57

## 2023-01-30 RX ADMIN — SODIUM CHLORIDE, SODIUM GLUCONATE, SODIUM ACETATE, POTASSIUM CHLORIDE, MAGNESIUM CHLORIDE, SODIUM PHOSPHATE, DIBASIC, AND POTASSIUM PHOSPHATE 125 ML/HR: .53; .5; .37; .037; .03; .012; .00082 INJECTION, SOLUTION INTRAVENOUS at 05:03

## 2023-01-30 RX ADMIN — ONDANSETRON 4 MG: 2 INJECTION INTRAMUSCULAR; INTRAVENOUS at 19:07

## 2023-01-30 RX ADMIN — SODIUM CHLORIDE, SODIUM LACTATE, POTASSIUM CHLORIDE, AND CALCIUM CHLORIDE: .6; .31; .03; .02 INJECTION, SOLUTION INTRAVENOUS at 19:49

## 2023-01-30 RX ADMIN — ONDANSETRON 4 MG: 2 INJECTION INTRAMUSCULAR; INTRAVENOUS at 20:49

## 2023-01-30 RX ADMIN — IOHEXOL 100 ML: 350 INJECTION, SOLUTION INTRAVENOUS at 01:48

## 2023-01-30 RX ADMIN — SODIUM CHLORIDE 1000 ML: 0.9 INJECTION, SOLUTION INTRAVENOUS at 00:50

## 2023-01-30 RX ADMIN — FENTANYL CITRATE 25 MCG: 50 INJECTION, SOLUTION INTRAMUSCULAR; INTRAVENOUS at 20:40

## 2023-01-30 RX ADMIN — MIDAZOLAM HYDROCHLORIDE 2 MG: 1 INJECTION, SOLUTION INTRAMUSCULAR; INTRAVENOUS at 18:51

## 2023-01-30 RX ADMIN — SUGAMMADEX 150 MG: 100 INJECTION, SOLUTION INTRAVENOUS at 20:14

## 2023-01-30 RX ADMIN — ROCURONIUM BROMIDE 40 MG: 10 INJECTION INTRAVENOUS at 18:57

## 2023-01-30 RX ADMIN — DEXAMETHASONE SODIUM PHOSPHATE 7 MG: 10 INJECTION, SOLUTION INTRAMUSCULAR; INTRAVENOUS at 18:57

## 2023-01-30 RX ADMIN — SODIUM CHLORIDE, POTASSIUM CHLORIDE, SODIUM LACTATE AND CALCIUM CHLORIDE 125 ML/HR: 600; 310; 30; 20 INJECTION, SOLUTION INTRAVENOUS at 11:03

## 2023-01-30 RX ADMIN — CEFAZOLIN 2000 MG: 1 INJECTION, POWDER, FOR SOLUTION INTRAMUSCULAR; INTRAVENOUS at 18:54

## 2023-01-30 RX ADMIN — FENTANYL CITRATE 100 MCG: 50 INJECTION, SOLUTION INTRAMUSCULAR; INTRAVENOUS at 18:57

## 2023-01-30 RX ADMIN — ACETAMINOPHEN 975 MG: 325 TABLET ORAL at 01:23

## 2023-01-30 RX ADMIN — DEXMEDETOMIDINE HCL 8 MCG: 100 INJECTION INTRAVENOUS at 18:54

## 2023-01-30 RX ADMIN — SODIUM CHLORIDE, SODIUM LACTATE, POTASSIUM CHLORIDE, AND CALCIUM CHLORIDE: .6; .31; .03; .02 INJECTION, SOLUTION INTRAVENOUS at 18:51

## 2023-01-30 RX ADMIN — FENTANYL CITRATE 25 MCG: 50 INJECTION, SOLUTION INTRAMUSCULAR; INTRAVENOUS at 20:46

## 2023-01-30 RX ADMIN — LIDOCAINE HYDROCHLORIDE 80 MG: 20 INJECTION INTRAVENOUS at 18:57

## 2023-01-30 NOTE — Clinical Note
Joanna Walker accompanied Julia Meier to the emergency department on 1/29/2023  Return date if applicable: 17/77/7677        If you have any questions or concerns, please don't hesitate to call        Linh Crockett, DO

## 2023-01-30 NOTE — Clinical Note
Erlinda Kramer accompanied Axel Mulligan to the emergency department on 1/29/2023  Return date if applicable: 85/78/1846        If you have any questions or concerns, please don't hesitate to call        Jorge Banks RN

## 2023-01-30 NOTE — H&P
H&P Exam - General Surgery   Isidro Hooper 40 y o  female MRN: [id number]  Unit/Bed#: ED 01 Encounter: [id number]    Assessment/Plan     Assessment:  44yo F presenting with right-sided chest pain, RUQ abdominal pain   -Afebrile, VSS on room air  -No leukocytosis, WBC 8 3  -CMP unremarkable  -CT with findings significant for cholelithiasis, acute cholecystitis recommend RUQ US or HIDA for further evaluation  -RUQ US significant for cholelithiasis and gallbladder sludge without evidence of acute cholecystitis    Past surg hx significant for tubal ligation     Plan:  -Admit to general surgery service  Plan for OR today for laparoscopic cholecystectomy  -NPO in anticipation of OR  -IVF hydration  -As needed analgesia/antiemetics    Discussed with Dr Britton Yi, on call general surgeon     History of Present Illness   HPI:  Isidro Hooper is a 40 y o  female who presents with 10/10 right-sided chest pain and right upper quadrant abdominal pain radiating to her back which began late last evening after eating a cheesesteak  Patient states she has never experienced pain like this before, notes no exacerbating or relieving factors  Endorses nausea, no episodes of emesis  Denies episodes of similar symptoms in the past   Pain is now resolved, but does report ongoing nausea  Not taking nay blood thinning medications  Past surgical hx of tubal ligation  Review of Systems   Constitutional: Negative for chills and fever  HENT: Negative for congestion  Respiratory: Negative for shortness of breath  Cardiovascular: Negative for chest pain and leg swelling  Gastrointestinal: Positive for abdominal pain and nausea  Negative for vomiting  Genitourinary: Negative for difficulty urinating  All other ROS negative unless stated above    Historical Information   History reviewed  No pertinent past medical history    Past Surgical History:   Procedure Laterality Date   • TUBAL LIGATION       Social History Social History     Substance and Sexual Activity   Alcohol Use Yes   • Alcohol/week: 2 0 standard drinks   • Types: 2 Glasses of wine per week    Comment: occasionally      Social History     Substance and Sexual Activity   Drug Use Never     Social History     Tobacco Use   Smoking Status Never   Smokeless Tobacco Never     E-Cigarette/Vaping   • E-Cigarette Use Never User      E-Cigarette/Vaping Substances     Family History: non-contributory    Meds/Allergies   all medications and allergies reviewed  No Known Allergies    Objective   First Vitals:   Blood Pressure: 153/91 (01/30/23 0006)  Pulse: 85 (01/30/23 0006)  Temperature: 97 8 °F (36 6 °C) (01/30/23 0630)  Temp Source: Temporal (01/30/23 0630)  Respirations: 17 (01/30/23 0006)  Height: 5' 3" (160 cm) (01/30/23 0006)  Weight - Scale: 74 8 kg (165 lb) (01/30/23 0006)  SpO2: 97 % (01/30/23 0006)    Current Vitals:   Blood Pressure: 122/64 (01/30/23 0630)  Pulse: 73 (01/30/23 0630)  Temperature: 97 8 °F (36 6 °C) (01/30/23 0630)  Temp Source: Temporal (01/30/23 0630)  Respirations: 13 (01/30/23 0630)  Height: 5' 3" (160 cm) (01/30/23 0006)  Weight - Scale: 74 8 kg (165 lb) (01/30/23 0006)  SpO2: 98 % (01/30/23 0630)    No intake or output data in the 24 hours ending 01/30/23 0909    Invasive Devices     Peripheral Intravenous Line  Duration           Peripheral IV 01/30/23 Proximal;Right;Ventral (anterior) Forearm <1 day                Physical Exam  Vitals reviewed  Constitutional:       General: She is not in acute distress  Appearance: She is not ill-appearing or toxic-appearing  HENT:      Head: Normocephalic and atraumatic  Mouth/Throat:      Mouth: Mucous membranes are moist    Eyes:      General: No scleral icterus  Cardiovascular:      Rate and Rhythm: Normal rate and regular rhythm  Heart sounds: No murmur heard  Pulmonary:      Effort: Pulmonary effort is normal  No respiratory distress  Breath sounds:  No wheezing, rhonchi or rales  Abdominal:      General: Bowel sounds are normal  There is no distension  Palpations: Abdomen is soft  Tenderness: There is abdominal tenderness (mild RUQ)  There is no guarding or rebound  Musculoskeletal:      Right lower leg: No edema  Left lower leg: No edema  Skin:     General: Skin is warm and dry  Neurological:      General: No focal deficit present  Mental Status: She is alert and oriented to person, place, and time  Psychiatric:         Mood and Affect: Mood normal          Behavior: Behavior normal          Lab Results:   I have personally reviewed pertinent lab results  , CBC:   Lab Results   Component Value Date    WBC 8 32 01/30/2023    HGB 12 6 01/30/2023    HCT 36 8 01/30/2023    MCV 92 01/30/2023     (H) 01/30/2023    MCH 31 6 01/30/2023    MCHC 34 2 01/30/2023    RDW 13 2 01/30/2023    MPV 9 0 01/30/2023    NRBC 0 01/30/2023   , CMP:   Lab Results   Component Value Date    SODIUM 137 01/30/2023    K 3 5 01/30/2023     01/30/2023    CO2 24 01/30/2023    BUN 10 01/30/2023    CREATININE 0 89 01/30/2023    CALCIUM 9 9 01/30/2023    AST 22 01/30/2023    ALT 14 01/30/2023    ALKPHOS 74 01/30/2023    EGFR 80 01/30/2023   , Lipase:   Lab Results   Component Value Date    LIPASE 25 01/30/2023     Imaging: I have personally reviewed pertinent reports  CTAP 1/30/23 "Cholelithiasis with findings suggesting acute cholecystitis, as described above  Please see discussion  Surgical consultation and follow-up is recommended  If further imaging evaluation is indicated, gallbladder ultrasound and/or nuclear medicine   hepatobiliary imaging could be performed  MRI with MRCP could also be considered, if there are no contraindications      There are several mildly prominent mediastinal nodes, measuring up to 9 mm short axis    Clinical correlation and follow-up recommended "    RUQ US 1/30/23 "Cholelithiasis and gallbladder sludge without sonographic evidence of acute cholecystitis  Consider follow-up with HIDA scan if it would alter patient management      No biliary dilation      Small hepatic simple cysts "    EKG, Pathology, and Other Studies: I have personally reviewed pertinent reports  Code Status: No Order    Counseling / Coordination of Care  Total floor / unit time spent today 30 minutes  Greater than 50% of total time was spent with the patient and / or family counseling and / or coordination of care  Victor M Sparks  01/30/23  **Please Note: This note may have been constructed using a voice recognition system  **

## 2023-01-30 NOTE — ANESTHESIA PREPROCEDURE EVALUATION
Procedure:  CHOLECYSTECTOMY LAPAROSCOPIC (Abdomen)    Relevant Problems   No relevant active problems        Physical Exam    Airway    Mallampati score: I         Dental   No notable dental hx     Cardiovascular  Rhythm: regular, Rate: normal, Cardiovascular exam normal    Pulmonary  Pulmonary exam normal Breath sounds clear to auscultation,     Other Findings        Anesthesia Plan  ASA Score- 1     Anesthesia Type- general with ASA Monitors  Additional Monitors:   Airway Plan: ETT  Plan Factors-Exercise tolerance (METS): >4 METS  Chart reviewed  EKG reviewed  Imaging results reviewed  Existing labs reviewed  Patient summary reviewed  Patient is not a current smoker  Induction- intravenous  Postoperative Plan-     Informed Consent- Anesthetic plan and risks discussed with patient and spouse  I personally reviewed this patient with the CRNA  Discussed and agreed on the Anesthesia Plan with the CRNA  Ewa Cohn

## 2023-01-30 NOTE — Clinical Note
Mekhi Magallon was seen and treated in our emergency department on 1/29/2023  Other - See Comments    No work until cleared by surgeon    Diagnosis: Moises Valencia    She may return on this date:     Dr Grayson Molina      If you have any questions or concerns, please don't hesitate to call        Arnold Fontaine RN    ______________________________           _______________          _______________  Hospital Representative                              Date                                Time

## 2023-01-30 NOTE — Clinical Note
iVpul Braun accompanied Sugey Rosado to the emergency department on 1/29/2023  Return date if applicable: 33/72/6847        If you have any questions or concerns, please don't hesitate to call        Ramo Casas, DO

## 2023-01-30 NOTE — Clinical Note
Sean Sesay accompanied Indio Franco to the emergency department on 1/29/2023  Return date if applicable: 29/09/5204        If you have any questions or concerns, please don't hesitate to call        Vera Krishnan, DO

## 2023-01-30 NOTE — Clinical Note
Rachell Schneider accompanied Brody Lopez to the emergency department on 1/29/2023  Return date if applicable: 00/48/3320        If you have any questions or concerns, please don't hesitate to call        Alda Mulligan, DO

## 2023-01-30 NOTE — ED NOTES
Clarified with Dr Jose Iqbal, hold heparin until after OR       Segundo Adjutant, MIKEY  01/30/23 4807

## 2023-01-30 NOTE — Clinical Note
Ashlyn Dill was seen and treated in our emergency department on 1/29/2023  Other - See Comments    No work until cleared by surgeon    Diagnosis:     [place]    She may return on this date:     Dr Yashira Celis      If you have any questions or concerns, please don't hesitate to call        Luis Alberto Walters RN    ______________________________           _______________          _______________  Hospital Representative                              Date                                Time

## 2023-01-30 NOTE — ED PROVIDER NOTES
History  Chief Complaint   Patient presents with   • Chest Pain     2230 Right chest pain and into side and back  Flushed lightheaded  Nausea sob at onset  5min of symptoms  On arrival dull chest pain right chest and right upper back  No otc use  Same dull pain at [phone] while at work today and went away  No hx of similar in past       39 yo female presenting to the ed for about 2 episodes of dull chest pain earlier in the day and then severe episode of R sided chest pain and RUQ pain and into the back  Reports felt like 10/10 abdominal pain with tingling/numbness as well  Lasted for about 10 minutes and went away on its own  Didn't take anything  never had this before  Still having dull R sided chest and back pain  Prior to Admission Medications   Prescriptions Last Dose Informant Patient Reported? Taking? Biotin 5000 MCG CAPS   Yes No   Sig: Take 1 capsule by mouth daily   triamcinolone (KENALOG) 0 1 % cream   No No   Sig: Apply topically 2 (two) times a day      Facility-Administered Medications: None       History reviewed  No pertinent past medical history  Past Surgical History:   Procedure Laterality Date   • CHOLECYSTECTOMY LAPAROSCOPIC N/A 1/30/2023    Procedure: CHOLECYSTECTOMY LAPAROSCOPIC;  Surgeon: Gerald Sandoval MD;  Location: [facility];  Service: General   • TUBAL LIGATION         Family History   Problem Relation Age of Onset   • Thyroid disease Mother         Hypo   • No Known Problems Daughter    • No Known Problems Daughter    • Stroke Maternal Grandmother    • Diabetes Maternal Grandmother    • No Known Problems Paternal Grandmother    • No Known Problems Maternal Aunt    • No Known Problems Maternal Aunt    • No Known Problems Maternal Aunt    • No Known Problems Maternal Aunt    • No Known Problems Paternal Aunt      I have reviewed and agree with the history as documented      E-Cigarette/Vaping   • E-Cigarette Use Never User      E-Cigarette/Vaping Substances     Social History     Tobacco Use   • Smoking status: Never   • Smokeless tobacco: Never   Vaping Use   • Vaping Use: Never used   Substance Use Topics   • Alcohol use: Yes     Alcohol/week: 2 0 standard drinks     Types: 2 Glasses of wine per week     Comment: occasionally    • Drug use: Never       Review of Systems   Respiratory: Positive for chest tightness and shortness of breath  Cardiovascular: Positive for chest pain  Gastrointestinal: Positive for abdominal pain  All other systems reviewed and are negative  Physical Exam  Physical Exam  Vitals and nursing note reviewed  Constitutional:       General: She is not in acute distress  Appearance: She is well-developed  She is not diaphoretic  HENT:      Head: Normocephalic and atraumatic  Right Ear: External ear normal       Left Ear: External ear normal       Nose: Nose normal    Eyes:      General: No scleral icterus  Right eye: No discharge  Left eye: No discharge  Conjunctiva/sclera: Conjunctivae normal       Pupils: Pupils are equal, round, and reactive to light  Neck:      Vascular: No JVD  Trachea: No tracheal deviation  Cardiovascular:      Rate and Rhythm: Normal rate and regular rhythm  Heart sounds: Normal heart sounds  No murmur heard  No friction rub  No gallop  Pulmonary:      Effort: Pulmonary effort is normal  No respiratory distress  Breath sounds: Normal breath sounds  No stridor  No decreased breath sounds, wheezing, rhonchi or rales  Abdominal:      General: Bowel sounds are normal  There is no distension  Palpations: Abdomen is soft  There is no mass  Tenderness: There is no abdominal tenderness  There is no guarding or rebound  Musculoskeletal:         General: No tenderness or deformity  Normal range of motion  Cervical back: Normal range of motion and neck supple  Skin:     General: Skin is warm and dry  Coloration: Skin is not pale        Findings: No erythema or rash  Neurological:      Mental Status: She is alert and oriented to person, place, and time  Cranial Nerves: No cranial nerve deficit  Sensory: No sensory deficit  Motor: No abnormal muscle tone  Psychiatric:         Behavior: Behavior normal          Thought Content:  Thought content normal          Judgment: Judgment normal          Vital Signs  ED Triage Vitals   Temperature Pulse Respirations Blood Pressure SpO2   01/30/23 0630 01/30/23 0006 01/30/23 0006 01/30/23 0006 01/30/23 0006   97 8 °F (36 6 °C) 85 17 153/91 97 %      Temp Source Heart Rate Source Patient Position - Orthostatic VS BP Location FiO2 (%)   01/30/23 0630 01/30/23 1104 01/30/23 1104 01/30/23 1104 --   Temporal Monitor Lying Left arm       Pain Score       01/30/23 0006       4           Vitals:    01/30/23 2040 01/30/23 2050 01/30/23 2100 01/30/23 2312   BP: 120/56 114/56 117/56 122/55   Pulse: 79 76 83 80   Patient Position - Orthostatic VS:    Lying         Visual Acuity      ED Medications  Medications   sodium chloride 0 9 % bolus 1,000 mL (0 mL Intravenous Stopped 1/30/23 0243)   acetaminophen (TYLENOL) tablet 975 mg (975 mg Oral Given 1/30/23 0123)   iohexol (OMNIPAQUE) 350 MG/ML injection (SINGLE-DOSE) 100 mL (100 mL Intravenous Given 1/30/23 0148)   ondansetron (ZOFRAN) injection 4 mg (4 mg Intravenous Given 1/30/23 2049)       Diagnostic Studies  Results Reviewed     Procedure Component Value Units Date/Time    POCT pregnancy, urine [[id number]]  (Normal) Resulted: 01/30/23 1507    Lab Status: Final result Updated: 01/30/23 1508     EXT Preg Test, Ur Negative     Control Valid    HS Troponin I 2hr [[id number]] Collected: 01/30/23 0242    Lab Status: Final result Specimen: Blood from Arm, Right Updated: 01/30/23 0309     hs TnI 2hr <2 ng/L      Delta 2hr hsTnI --    hCG, quantitative [[id number]]  (Normal) Collected: 01/30/23 0049    Lab Status: Final result Specimen: Blood from Arm, Right Updated: 01/30/23 0121     HCG, Quant <1 mIU/mL     Narrative:       Expected Ranges:     Approximate               Approximate HCG  Gestation age          Concentration ( mIU/mL)  _____________          ______________________   Gail Edwards                      HCG values  0 2-1                       5-50  1-2                           2-3                         100-5000  3-4                         500-79874  4-5                         1000-05474  5-6                         73194-010255  6-8                         06650-802197  8-12                        05110-190238      HS Troponin 0hr (reflex protocol) [[id number]]  (Normal) Collected: 01/30/23 0049    Lab Status: Final result Specimen: Blood from Arm, Right Updated: 01/30/23 0121     hs TnI 0hr <2 ng/L     Comprehensive metabolic panel [[id number]] Collected: 01/30/23 0049    Lab Status: Final result Specimen: Blood from Arm, Right Updated: 01/30/23 0120     Sodium 137 mmol/L      Potassium 3 5 mmol/L      Chloride 104 mmol/L      CO2 24 mmol/L      ANION GAP 9 mmol/L      BUN 10 mg/dL      Creatinine 0 89 mg/dL      Glucose 102 mg/dL      Calcium 9 9 mg/dL      AST 22 U/L      ALT 14 U/L      Alkaline Phosphatase 74 U/L      Total Protein 7 1 g/dL      Albumin 4 3 g/dL      Total Bilirubin 0 37 mg/dL      eGFR 80 ml/min/1 73sq m     Narrative:      [place] guidelines for Chronic Kidney Disease (CKD):   •  Stage 1 with normal or high GFR (GFR > 90 mL/min/1 73 square meters)  •  Stage 2 Mild CKD (GFR = 60-89 mL/min/1 73 square meters)  •  Stage 3A Moderate CKD (GFR = 45-59 mL/min/1 73 square meters)  •  Stage 3B Moderate CKD (GFR = 30-44 mL/min/1 73 square meters)  •  Stage 4 Severe CKD (GFR = 15-29 mL/min/1 73 square meters)  •  Stage 5 End Stage CKD (GFR <15 mL/min/1 73 square meters)  Note: GFR calculation is accurate only with a steady state creatinine    Lipase [[id number]]  (Normal) Collected: 01/30/23 0049    Lab Status: Final result Specimen: Blood from Arm, Right Updated: 01/30/23 0120     Lipase 25 u/L     CBC and differential [[id number]]  (Abnormal) Collected: 01/30/23 0049    Lab Status: Final result Specimen: Blood from Arm, Right Updated: 01/30/23 0057     WBC 8 32 Thousand/uL      RBC 3 99 Million/uL      Hemoglobin 12 6 g/dL      Hematocrit 36 8 %      MCV 92 fL      MCH 31 6 pg      MCHC 34 2 g/dL      RDW 13 2 %      MPV 9 0 fL      Platelets 078 Thousands/uL      nRBC 0 /100 WBCs      Neutrophils Relative 71 %      Immat GRANS % 0 %      Lymphocytes Relative 19 %      Monocytes Relative 9 %      Eosinophils Relative 1 %      Basophils Relative 0 %      Neutrophils Absolute 5 93 Thousands/µL      Immature Grans Absolute 0 02 Thousand/uL      Lymphocytes Absolute 1 55 Thousands/µL      Monocytes Absolute 0 75 Thousand/µL      Eosinophils Absolute 0 04 Thousand/µL      Basophils Absolute 0 03 Thousands/µL                  US right upper quadrant   Final Result by Loulou Love MD (01/30 5631)      Cholelithiasis and gallbladder sludge without sonographic evidence of acute cholecystitis  Consider follow-up with HIDA scan if it would alter patient management  No biliary dilation  Small hepatic simple cysts  This study demonstrates a significant  finding and was documented as such in [facility] for liaison and referring practitioner notification  Workstation performed: [id number]         CT chest abdomen pelvis w contrast   Final Result by Chester Marin MD (01/30 3084)      Cholelithiasis with findings suggesting acute cholecystitis, as described above  Please see discussion  Surgical consultation and follow-up is recommended  If further imaging evaluation is indicated, gallbladder ultrasound and/or nuclear medicine    hepatobiliary imaging could be performed  MRI with MRCP could also be considered, if there are no contraindications        There are several mildly prominent mediastinal nodes, measuring up to 9 mm short axis  Clinical correlation and follow-up recommended  I personally discussed this study with Brenna Rafiq on 1/30/2023 at 2:45 AM                      Workstation performed: [id number]                    Procedures  ECG 12 Lead Documentation Only    Date/Time: 1/30/2023 4:58 AM  Performed by: Emily Bradley DO  Authorized by: Emily Bradley DO     Interpretation:     Interpretation: normal    Rate:     ECG rate:  80    ECG rate assessment: normal    Rhythm:     Rhythm: sinus rhythm    Ectopy:     Ectopy: none    QRS:     QRS axis:  Normal    QRS intervals:  Normal  Conduction:     Conduction: normal    ST segments:     ST segments:  Normal  T waves:     T waves: normal               ED Course  ED Course as of 01/31/23 1157   Mon Jan 30, 2023   0122 hs TnI 0hr: <2   0122 HCG QUANTITATIVE: <1   0320 Spoke with General Surgery who is requesting RUQ US and they will eval when they come in, in the am               HEART Risk Score    Flowsheet Row Most Recent Value   Heart Score Risk Calculator    History 0 Filed at: 01/30/2023 0458   ECG 0 Filed at: 01/30/2023 0458   Age 0 Filed at: 01/30/2023 0458   Risk Factors 0 Filed at: 01/30/2023 0458   Troponin 0 Filed at: 01/30/2023 0458   HEART Score 0 Filed at: 01/30/2023 2959  [address]  Patient presenting for episodes of chest pain and abdominal pain throughout the day  Most severe approximately 2 hours prior to arrival   Patient states lasted for about 15 minutes and then self resolved  Has never had this before  Cardiac work-up and abdominal labs without any obvious acute cause  CAT scan showing possible acute cholecystitis  Case discussed with general surgery who is requesting ultrasound and will evaluate in the morning  Patient's pain relieved with Tylenol  Signed out to Dr Donn Edward pending RUQ US and Surgery evaluation       Cholecystitis: acute illness or injury  Amount and/or Complexity of Data Reviewed  Labs: ordered  Decision-making details documented in ED Course  Radiology: ordered  Risk  OTC drugs  Prescription drug management  Disposition  Final diagnoses:   Cholecystitis     Time reflects when diagnosis was documented in both MDM as applicable and the Disposition within this note     Time User Action Codes Description Comment    1/30/2023  3:19 AM Kalyani Douglas Add [K81 9] Cholecystitis     1/30/2023  7:18 PM Cathcorinnee Melody E Modify [K81 9] Cholecystitis     1/30/2023  8:17 PM Heath Litter T Modify [K81 9] Cholecystitis     1/30/2023  8:17 PM Heath Litter T Add [Y30 48] Biliary colic     7/43/1531  3:82 PM Heath Litter T Add [K80 12] Calculus of gallbladder with acute on chronic cholecystitis without obstruction       ED Disposition     ED Disposition   Discharge    Condition   --    Date/Time   Mon Jan 30, 2023 11:14 PM    4555 LYNNE Valderrama discharge to home/self care                 Follow-up Information    None         Discharge Medication List as of 1/30/2023 10:52 PM      START taking these medications    Details   oxyCODONE-acetaminophen (PERCOCET) 5-325 mg per tablet Take 1 tablet by mouth every 4 (four) hours as needed for moderate pain for up to 12 doses Max Daily Amount: 6 tablets, Starting Mon 1/30/2023, Normal         CONTINUE these medications which have NOT CHANGED    Details   Biotin 5000 MCG CAPS Take 1 capsule by mouth daily, Historical Med      triamcinolone (KENALOG) 0 1 % cream Apply topically 2 (two) times a day, Starting Mon 1/24/2022, Normal             Outpatient Discharge Orders   Discharge Diet     No strenuous exercise     Shower Daily     No driving until     Call provider for:  persistent nausea or vomiting     Call provider for:  severe uncontrolled pain     Call provider for: active or persistent bleeding     Call provider for:  redness, tenderness, or signs of infection (pain, swelling, redness, odor or green/yellow discharge around incision site)     Remove dressing in 48 hours       PDMP Review     None          ED Provider  Electronically Signed by           Farhad Bagley DO  01/31/23 0955

## 2023-01-31 NOTE — ED NOTES
Pt returned from OR, fluids infusing  Pt has 2/10 pain and is tolerating PO  Will encourage patient to void        Darwin Dean RN  01/30/23 5588

## 2023-01-31 NOTE — DISCHARGE SUMMARY
Discharge Summary - Jayne Slater 40 y o  female MRN: [id number]    Unit/Bed#: OR POOL Encounter: [id number]    Admission Date:   Admission Orders (From admission, onward)     Ordered        01/30/23 0933  Place in Observation  Once                        Admitting Diagnosis: Cholecystitis [K81 9]  Chest pain [R07 9]    HPI: The patient is a healthy 39 yo WF presenting to the ER with severe RUQ pain, radiating to back associated with nausea and vomiting  Found to have gallstones and gallbladder sludge  Procedures Performed:   Orders Placed This Encounter   Procedures   • ED ECG Documentation Only       Summary of Hospital Course: The patient was offered Laparoscopic Cholecystectomy, which she tolerated well  And was ultimately discharged to convalesce at home with her family  Significant Findings, Care, Treatment and Services Provided:   Acute on Chronic inflammation of the gallbladder  Gallstones  Complications: None    Discharge Diagnosis: Severe biliary colic, chronic calculus cholecystitis    Medical Problems     Resolved Problems  Date Reviewed: 1/30/2023   None         Condition at Discharge: good         Discharge instructions/Information to patient and family:   See after visit summary for information provided to patient and family  Provisions for Follow-Up Care:  See after visit summary for information related to follow-up care and any pertinent home health orders  PCP: Sharmin Nam PA-C    Disposition: Home    Planned Readmission: No      Discharge Statement   I spent 25 minutes discharging the patient  This time was spent on the day of discharge  I had direct contact with the patient on the day of discharge  Additional documentation is required if more than 30 minutes were spent on discharge  Discharge Medications:  See after visit summary for reconciled discharge medications provided to patient and family

## 2023-01-31 NOTE — DISCHARGE INSTR - AVS FIRST PAGE
Your dressings are waterproof, you can shower with them in place  Remove the dressings in 48 hrs, leave the steri-strips in place  You can continue to shower, but no tub baths until the strips fall off  Tylenol and or Advil for pain, if you feel you need Percocet, you can fill the prescription  No driving while taking percocet  If you become constipated use a mild laxative such as MiraLax or Milk of Magnesia for relief      No heavy lifting or strenuous activity for about 2 weeks, governed by the level of your discomfort

## 2023-01-31 NOTE — OP NOTE
OPERATIVE REPORT  PATIENT NAME: Shabnam Kilgore    :  1980  MRN: [id number]  Pt Location: [facility] ROOM 02    SURGERY DATE: 2023    Surgeon(s) and Role:     * Carol Valiente MD - Primary     * Matt Juárez PA-C - Assisting    Preop Diagnosis:  Cholecystitis [K81 9]    Post-Op Diagnosis Codes:     * Cholecystitis [K81 9]    Procedure(s):  CHOLECYSTECTOMY LAPAROSCOPIC    Specimen(s):  ID Type Source Tests Collected by Time Destination   1 :  Tissue Gallbladder TISSUE EXAM Carol Valiente MD 2023        Estimated Blood Loss:   Minimal    Drains:  [REMOVED] NG/OG/Enteral Tube Orogastric 16 Fr Center mouth (Removed)   Number of days: 0       Anesthesia Type:   General    Operative Indications:  Cholecystitis [L27 3]  Biliary Colic, acute on chronic cholecystitis    Operative Findings:  Extensive adhesions of omentum to Gallbladder  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    Complications:   None    Procedure and Technique:  The patient was identified by myself taken to the operating room and placed in a supine position on the operating table  After induction of satisfactory general endotracheal anesthesia she was prepped and draped in the usual sterile fashion using ChloraPrep solution  A timeout was called the patient identified as Manisha Ferrell, IV antibiotics were confirmed is given sequential compression devices were on and functioning all parties agreed this was the appropriate patient for the proposed procedure  Local anesthetic consisting of quarter percent Marcaine with epinephrine was infiltrated following which a curvilinear infraumbilical incision was performed using a #15 scalpel and deepened through the skin and subcutaneous tissue  The abdominal wall was elevated with a perforating towel clip and a Veress needle was introduced  After performing the saline drop test the abdomen was insufflated with carbon dioxide    After achieving satisfactory pneumoperitoneum the Veress needle was removed and a 11 mm optical cannula with a 5 mm 0 degree scope within was introduced  After breaching the peritoneum the trocar portion was removed and then the scope reintroduced  There was no evidence of trauma to the viscera from the Veress needle or port placement  Attention was turned to the right upper quadrant  The fundus of a chronically inflamed gallbladder was visualized underneath the liver edge and there did appear to be adhesions to the gallbladder, the liver was otherwise unremarkable in appearance  Under laparoscopic direction an epigastric 5 mm port and 2 right subcostal 5 mm ports were placed  The fundus of the gallbladder was grasped via the lateral subcostal port and directed cephalad  The patient was placed in reverse Trendelenburg position and rolled to the left side  There were extensive adhesions to the gallbladder demonstrated, and these were lysed using monopolar electrocautery and by blunt dissection with a [place] dissector  As the infundibulum of the gallbladder came into view this was grasped via the medial subcostal port and directed laterally  As the adhesions were stripped back, 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, this ran along the medial edge of the gallbladder and in the fossa  This was dissected away from the gallbladder and additional adhesions were lysed  The cystic duct and the cystic artery were identified in the triangle of Calot, the cystic duct was skeletonized clipped and divided  The cystic artery was likewise developed into a pedicle clipped and divided  The gallbladder was then delivered using great care to dissect it away from the right hepatic artery  The gallbladder was also partially intrahepatic making the dissection even more tedious  At the fundus of the gallbladder dissection was begun in a dome down fashion to further dissected away from its fossa    When this was achieved the gallbladder was moved over the dome of the liver  The scope was moved to the epigastric port and an Endo Catch bag was introduced into the umbilical port and the gallbladder isolated within  The patient was returned to a neutral position and the gallbladder retrieved in the process of removing the 11 mm port and the Endo Catch bag  There was at least one large gallstone in the gallbladder noted  The pneumoperitoneum was allowed to dissipate and the remaining ports were removed  The fascia at the umbilical port site was repaired using interrupted figure-of-eight 0 Vicryl suture  Generous amounts of local anesthetic were infiltrated into all the port sites for postoperative analgesia  The skin was closed using subcuticular 4-0 Vicryl suture  The wounds were cleansed with saline and benzoin, Steri-Strips, 2 x 2 gauze dressing and Tegaderm applied  The patient made an uneventful recovery from her anesthetic, was extubated in the operating room moved to her waiting stretcher and taken to the recovery room in good condition having tolerated the procedure well       I was present for the entire procedure, A qualified resident physician was not available and A physician assistant was required during the procedure for retraction tissue handling,dissection and suturing    Patient Disposition:  PACU  and extubated and stable        SIGNATURE: Lelo Barton MD  DATE: January 30, 2023  TIME: 8:49 PM

## 2023-01-31 NOTE — ANESTHESIA POSTPROCEDURE EVALUATION
Post-Op Assessment Note    CV Status:  Stable  Pain Score: 0    Pain management: adequate     Mental Status:  Alert and awake   Hydration Status:  Euvolemic   PONV Controlled:  Controlled   Airway Patency:  Patent      Post Op Vitals Reviewed: Yes      Staff: CRNA         No notable events documented      BP   118/56   Temp   98   Pulse  100   Resp   14   SpO2   99

## 2023-02-09 ENCOUNTER — OFFICE VISIT (OUTPATIENT)
Dept: SURGERY | Facility: CLINIC | Age: 43
End: 2023-02-09

## 2023-02-09 VITALS — TEMPERATURE: 97.8 F

## 2023-02-09 DIAGNOSIS — K80.10 CALCULUS OF GALLBLADDER WITH CHRONIC CHOLECYSTITIS WITHOUT OBSTRUCTION: Primary | ICD-10-CM

## 2023-02-09 DIAGNOSIS — K80.50 BILIARY COLIC: ICD-10-CM

## 2023-02-09 PROBLEM — K80.20 CHOLELITHIASIS: Status: RESOLVED | Noted: 2023-01-30 | Resolved: 2023-02-09

## 2023-02-09 NOTE — PROGRESS NOTES
Assessment/Plan:    Cholelithiasis  POD 10 from Lap Lianna    RTW POD 12    Doing well    Biliary colic  Resolved with surgery       Diagnoses and all orders for this visit:    Calculus of gallbladder with chronic cholecystitis without obstruction    Biliary colic          Subjective:      Patient ID: Jayne Slater is a 40 y o  female  The patient presents in follow-up 10 days postop from laparoscopic cholecystectomy for severe biliary colic, bordering on early acute calculus cholecystitis  The patient has had no interval problems  Her incisions are healing uneventfully, and she does plan to return to work in 2 days  The pathology report was reviewed with the patient and does reflect chronic calculus cholecystitis  The patient is cleared to return to work on Saturday, and will follow-up on an as-needed basis  The following portions of the patient's history were reviewed and updated as appropriate: allergies, current medications, past family history, past medical history, past social history, past surgical history and problem list     Review of Systems   Constitutional: Negative for chills and fever  Gastrointestinal: Negative for constipation and diarrhea  Objective:      Temp 97 8 °F (36 6 °C) (Temporal)   LMP 01/18/2023 (Approximate)          Physical Exam  Constitutional:       Appearance: Normal appearance  Abdominal:      General: Abdomen is flat  Palpations: Abdomen is soft  Comments: Incisions healing uneventfully

## 2023-02-09 NOTE — PATIENT INSTRUCTIONS
Case Report   Surgical Pathology Report                         Case: [id number]                                    Authorizing Provider:  Fredrick Oro MD    Collected:           01/30/2023 1918               Ordering Location:     [facility] Received:            01/30/2023 2132                                      Operating Room                                                                Pathologist:           Steve Chávez DO                                                      Specimen:    Gallbladder                                                                                Final Diagnosis   A  Gallbladder, Cholecystectomy:  - Chronic cholecystitis with cholelithiasis  Electronically signed by Steve Chávez DO on 2/2/2023 at 10:07 AM   Additional Information    All reported additional testing was performed with appropriately reactive controls  These tests were developed and their performance characteristics determined by [facility] or appropriate performing facility, though some tests may be performed on tissues which have not been validated for performance characteristics (such as staining performed on alcohol exposed cell blocks and decalcified tissues)  Results should be interpreted with caution and in the context of the patients’ clinical condition  These tests may not be cleared or approved by the [place]  Food and Drug Administration, though the FDA has determined that such clearance or approval is not necessary  These tests are used for clinical purposes and they should not be regarded as investigational or for research  This laboratory has been approved by CLIA 88, designated as a high-complexity laboratory and is qualified to perform these tests          Interpretation performed at [facility], [address] [place] [address]     Your pathology report reflects gallstones and inflammation related to the gallstones  Your incisions are healing fine  I would recommend using cocoa butter on the scars as they heal    You can return to work without restriction

## 2023-02-09 NOTE — LETTER
February 9, 2023     Patient: Dave Pritchett  YOB: 1980  Date of Visit: 2/9/2023      To Whom it May Concern:    Julissa Goldman is under my professional care  Solis Hassan was seen in my office on 2/9/2023  Solis Hassan may return to work on 2/11/2023 without restriction  If you have any questions or concerns, please don't hesitate to call           Sincerely,          Deirdre Dejesus MD        CC: No Recipients

## 2023-02-28 NOTE — ED NOTES
2/28/2023 report attempted to reji at [phone]. Awaiting call back from nurse   Pt transported to OR with OR team     Viri Marinelli RN  01/30/23 2017

## 2023-10-26 ENCOUNTER — OFFICE VISIT (OUTPATIENT)
Dept: FAMILY MEDICINE CLINIC | Facility: CLINIC | Age: 43
End: 2023-10-26
Payer: COMMERCIAL

## 2023-10-26 VITALS
SYSTOLIC BLOOD PRESSURE: 128 MMHG | WEIGHT: 163.4 LBS | OXYGEN SATURATION: 99 % | DIASTOLIC BLOOD PRESSURE: 82 MMHG | HEART RATE: 68 BPM | HEIGHT: 63 IN | TEMPERATURE: 98.4 F | BODY MASS INDEX: 28.95 KG/M2

## 2023-10-26 DIAGNOSIS — R21 RASH: Primary | ICD-10-CM

## 2023-10-26 DIAGNOSIS — Z88.9 ALLERGY TO CONTACTANT: ICD-10-CM

## 2023-10-26 DIAGNOSIS — Z23 ENCOUNTER FOR IMMUNIZATION: ICD-10-CM

## 2023-10-26 PROCEDURE — 90686 IIV4 VACC NO PRSV 0.5 ML IM: CPT

## 2023-10-26 PROCEDURE — 3725F SCREEN DEPRESSION PERFORMED: CPT | Performed by: FAMILY MEDICINE

## 2023-10-26 PROCEDURE — 90471 IMMUNIZATION ADMIN: CPT

## 2023-10-26 PROCEDURE — 99213 OFFICE O/P EST LOW 20 MIN: CPT | Performed by: FAMILY MEDICINE

## 2023-10-26 RX ORDER — TRIAMCINOLONE ACETONIDE 1 MG/G
CREAM TOPICAL 2 TIMES DAILY
Qty: 30 G | Refills: 0 | Status: SHIPPED | OUTPATIENT
Start: 2023-10-26

## 2023-10-26 NOTE — PROGRESS NOTES
Name: Gail Silva      : 1980      MRN: [id number]  Encounter Provider: Dianna Mcneill DO  Encounter Date: 10/26/2023   Encounter department: [address] [place] [address]     1. Rash  -     Ambulatory Referral to Allergy; Future  -     triamcinolone (KENALOG) 0.1 % cream; Apply topically 2 (two) times a day    2. Allergy to contactant  -     Ambulatory Referral to Allergy; Future  -     triamcinolone (KENALOG) 0.1 % cream; Apply topically 2 (two) times a day    3. Encounter for immunization  -     influenza vaccine, quadrivalent, 0.5 mL, preservative-free, for adult and pediatric patients 6 mos+ (AFLURIA, FLUARIX, FLULAVAL, FLUZONE)    Similar axillary rash 2022 - allergist bernard was ordered, but pt did not go- states rash she had then resolved with rx steroid cream and she switched then to other deodorant and did not have sx recur until now    Depression Screening and Follow-up Plan: Patient was screened for depression during today's encounter. They screened negative with a PHQ-2 score of 0.         Subjective      Chief Complaint   Patient presents with    Allergic Reaction     Allergic reaction to deodorant under arms, painful, burning, itchy, started a week and a half ago and gradually got worse     Immunizations     Would like flu shot for work        Same day sick appt - c/o axillary rash  Chart review shows that pt had same c/o at her last visit here 2022, and was referred to allergist - did not go  Pt states she has been using other deodorant product than last year "found one that works and didn't have any reaction for a long time, but now I think brought on by stress- past week and a half, and I didn't have any of that cream- whatever that was last time took it away"  States this time she called earlier to be seen, so not as bad yet  Pt will call her GYN for mammo order  Just had bloodwork last Friday for new hire at [facility] - will try to get results      1/24/2022  [facility] [address] [place] [address], DO (Physician) · Family Medicine  Assessment/Plan   Diagnoses and all orders for this visit:  Allergy to contactant  -     Ambulatory Referral to Allergy; Future  Dermatitis  Comments:  appears to be allergic reaction to deodorant, pt advised to stop use of all deodorant/antiperspirants and allergy eval ordered  Orders:  -     triamcinolone (KENALOG) 0.1 % cream; Apply topically 2 (two) times a day  -     Ambulatory Referral to Allergy; Future  Palpitations  -     ECG 12 lead; Future  -     Holter monitor; Future  Family history of atrial fibrillation  -     ECG 12 lead; Future  -     Holter monitor; Future  Elevated blood pressure reading in office without diagnosis of hypertension  Comments:  bp elevated today- new - pt is anxious and uncomfortable due to rash             Rash  This is a recurrent problem. The current episode started in the past 7 days. The problem has been gradually worsening since onset. The affected locations include the left axilla and right axilla. The rash is characterized by burning, dryness, pain, redness, swelling and itchiness. She was exposed to nothing. Pertinent negatives include no anorexia, congestion, cough, diarrhea, facial edema, fatigue, fever, joint pain, rhinorrhea, shortness of breath, sore throat or vomiting. Allergic Reaction  Associated symptoms include a rash. Pertinent negatives include no coughing, diarrhea or vomiting. Review of Systems   Constitutional:  Negative for fatigue and fever. HENT:  Negative for congestion, rhinorrhea and sore throat. Eyes:  Negative for pain. Respiratory:  Negative for cough and shortness of breath. Gastrointestinal:  Negative for anorexia, diarrhea and vomiting. Musculoskeletal:  Negative for joint pain. Skin:  Positive for rash. No current outpatient medications on file prior to visit.        Objective     /82 (BP Location: Left arm, Patient Position: Sitting, Cuff Size: Standard)   Pulse 68   Temp 98.4 °F (36.9 °C) (Tympanic)   Ht 5' 3" (1.6 m)   Wt 74.1 kg (163 lb 6.4 oz)   SpO2 99%   BMI 28.95 kg/m²     Physical Exam  Vitals and nursing note reviewed. Constitutional:       General: She is not in acute distress. Appearance: She is well-groomed. She is not ill-appearing, toxic-appearing or diaphoretic. HENT:      Head: Normocephalic and atraumatic. Mouth/Throat:      Pharynx: Uvula midline. Neck:      Trachea: Phonation normal.   Pulmonary:      Effort: Pulmonary effort is normal.   Chest:       Skin:     General: Skin is warm and dry. Coloration: Skin is not pale. Neurological:      Mental Status: She is alert and oriented to person, place, and time. Psychiatric:         Behavior: Behavior is cooperative.        Yao Waite,

## 2024-01-29 ENCOUNTER — OFFICE VISIT (OUTPATIENT)
Dept: PODIATRY | Facility: CLINIC | Age: 44
End: 2024-01-29
Payer: COMMERCIAL

## 2024-01-29 ENCOUNTER — APPOINTMENT (OUTPATIENT)
Dept: RADIOLOGY | Facility: CLINIC | Age: 44
End: 2024-01-29
Payer: COMMERCIAL

## 2024-01-29 ENCOUNTER — OFFICE VISIT (OUTPATIENT)
Dept: URGENT CARE | Facility: CLINIC | Age: 44
End: 2024-01-29
Payer: COMMERCIAL

## 2024-01-29 VITALS
BODY MASS INDEX: 28.88 KG/M2 | WEIGHT: 163 LBS | OXYGEN SATURATION: 100 % | HEART RATE: 79 BPM | RESPIRATION RATE: 18 BRPM | DIASTOLIC BLOOD PRESSURE: 82 MMHG | HEIGHT: 63 IN | SYSTOLIC BLOOD PRESSURE: 118 MMHG

## 2024-01-29 VITALS
DIASTOLIC BLOOD PRESSURE: 74 MMHG | HEART RATE: 80 BPM | WEIGHT: 163 LBS | SYSTOLIC BLOOD PRESSURE: 124 MMHG | BODY MASS INDEX: 28.87 KG/M2

## 2024-01-29 DIAGNOSIS — M79.672 LEFT FOOT PAIN: ICD-10-CM

## 2024-01-29 DIAGNOSIS — S92.345A CLOSED NONDISPLACED FRACTURE OF FOURTH METATARSAL BONE OF LEFT FOOT, INITIAL ENCOUNTER: Primary | ICD-10-CM

## 2024-01-29 DIAGNOSIS — S89.92XA INJURY OF LEFT LOWER EXTREMITY, INITIAL ENCOUNTER: ICD-10-CM

## 2024-01-29 DIAGNOSIS — S92.212A: Primary | ICD-10-CM

## 2024-01-29 PROCEDURE — 73590 X-RAY EXAM OF LOWER LEG: CPT

## 2024-01-29 PROCEDURE — 99213 OFFICE O/P EST LOW 20 MIN: CPT | Performed by: NURSE PRACTITIONER

## 2024-01-29 PROCEDURE — 99203 OFFICE O/P NEW LOW 30 MIN: CPT | Performed by: STUDENT IN AN ORGANIZED HEALTH CARE EDUCATION/TRAINING PROGRAM

## 2024-01-29 PROCEDURE — 73630 X-RAY EXAM OF FOOT: CPT

## 2024-01-29 NOTE — PROGRESS NOTES
Assessment/Plan:    No problem-specific Assessment & Plan notes found for this encounter.       Diagnoses and all orders for this visit:    Closed nondisplaced fracture of fourth metatarsal bone of left foot, initial encounter  -     CT lower extremity wo contrast left; Future    Left foot pain  -     Ambulatory Referral to Orthopedic Surgery  -     CT lower extremity wo contrast left; Future      Plan:     - Patient was counseled and educated on the condition and the diagnosis.  The diagnosis, treatment options and prognosis were discussed in detail.   -Left foot x-rays reviewed, I personally reviewed x-ray finding with patient which is consistent with probably nondisplaced fourth and third metatarsal base fracture.  I will obtain a CT for further evaluation and management of this.  I will reach out to patient once CT scan is done with results and treatment recommendations..  -Patient to continue weight-bear as tolerated cam boot, rest ice elevate and compress with compression wrap.  Patient may take over-the-counter anti-inflammatories as needed for pain control.  -Patient to continue wearing cam boot with weight-bear as tolerated for 4 weeks  -Addressed all questions and concerns  -Recent urgent care notes reviewed    Subjective:      Patient ID: Sera Garner is a 43 y.o. female.    43-year-old female with past medical history as below presents for evaluation of left foot injury sustained yesterday night.  Patient reports she fell off hardwood floor 4 steps and injured her left foot.  Patient reports the pain is on the midfoot along the third and fourth metatarsals along with bruising.  She was evaluated urgent care earlier this morning and recommended to follow-up with podiatrist for further care and management of possible displaced cuboid fracture.  She also had an injury to her leg with mild leg scratch.  She was given a boot which she has been wearing it.  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  She has no other foot complications.        The following portions of the patient's history were reviewed and updated as appropriate: She  has no past medical history on file.  She   Patient Active Problem List    Diagnosis Date Noted    Closed nondisplaced fracture of fourth metatarsal bone of left foot 01/29/2024     She  has a past surgical history that includes Tubal ligation and CHOLECYSTECTOMY LAPAROSCOPIC (N/A, 1/30/2023).  No current outpatient medications on file.     No current facility-administered medications for this visit.   .    Review of Systems   All other systems reviewed and are negative.        Objective:      /74 (BP Location: Right arm, Patient Position: Sitting, Cuff Size: Standard)   Pulse 80   Wt 73.9 kg (163 lb)   LMP 01/08/2024 (Approximate)   BMI 28.87 kg/m²          Physical Exam  Vitals reviewed.   Musculoskeletal:      Left foot: Decreased range of motion. Swelling and tenderness present.      Comments: Tenderness to touch noted on the left midfoot along the third and fourth metatarsal bases.  Mild edema and ecchymosis noted to this area.  No pain with palpation along the cuboid dorsally laterally and plantarly.  No pain along the second and first TMT joints.  Palpable pedal pulses.  Light touch sensation intact.  Capillary fill time within normal limits.

## 2024-01-29 NOTE — PROGRESS NOTES
[facility]        NAME: Sera Garner is a 43 y.o. female  : 1980    MRN: [id number]  DATE: 2024  TIME: 11:37 AM      Assessment and Plan     Traumatic closed displaced fracture of cuboid bone of left foot, initial encounter [S92.212A]  1. Traumatic closed displaced fracture of cuboid bone of left foot, initial encounter  XR tibia fibula 2 vw left    XR foot 3+ vw left    Ambulatory Referral to Orthopedic Surgery        Patient fitted for cam boot by nursing; tolerated well.    Patient Instructions     Patient Instructions   Foot Fracture in Adults   AMBULATORY CARE:   A foot fracture  is a break in a bone in your foot.       Common signs and symptoms:   Tenderness over the injured area    Foot pain that increases when you try to stand or walk    Numbness in your foot or toes    Cracking sounds when you move your foot    Swelling, bruising, blistering, or open skin breaks    Trouble moving your foot or walking    Foot shape that is not normal    Call your local emergency number (911 in the [place]) if:   You suddenly feel lightheaded and short of breath.    You have chest pain when you take a deep breath or cough.    You cough up blood.    Seek care immediately if:   The pain in your injured foot gets worse even after you rest and take pain medicine.    The skin or toes of your foot become numb, swollen, cold, white, or blue.    You have more pain or swelling than you did before a cast was put on.    Your leg feels warm, tender, and painful. It may look swollen and red.    Call your doctor if:   You have a fever.    You have new sores around your boot, cast, or splint.    You have new or worsening trouble moving your foot.    You notice a foul smell coming from under your cast.    Your boot, cast, or splint gets damaged.    You have questions or concerns about your condition or care.    Treatment  depends on the kind of fracture you have and how bad it is. You may need any of the  following:  A boot, cast, or splint  may be put on your foot and lower leg to decrease your foot movement. These work to hold the broken bones in place, decrease pain, and prevent more damage to your foot.    Medicines  may be given to prevent or treat pain or a bacterial infection. You may also need a vaccine to prevent tetanus if bone broke through the skin. A tetanus shot is given if you have not had a booster in the past 5 to 10 years.    Surgery  may be used to put your bones back into the correct position. Wires, pins, plates, or screws may be used to keep the broken pieces lined up correctly and hold them together.    Self-care:   Rest  your foot and avoid activities that cause pain.    Apply ice  to decrease swelling and pain, and to prevent tissue damage. Use an ice pack, or put crushed ice in a plastic bag. Cover it with a towel before you apply it. Use ice for 15 to 20 minutes every hour or as directed.    Elevate your foot  above the level of your heart as often as you can. This will help decrease swelling and pain. Prop your foot on pillows or blankets to keep it elevated comfortably.         Physical therapy  may be needed when your foot has healed. A physical therapist can teach you exercises to help improve movement and strength, and to decrease pain.    Cast or splint care:   Ask when it is okay to take a bath or shower. Do not let your cast or splint get wet. Before you bathe, cover the cast or splint with a plastic bag. Tape the bag to your skin above the splint to seal out water. Keep your foot out of the water in case the bag leaks.    Check the skin around your splint daily for any redness or open areas.    Do not use a sharp or pointed object to scratch your skin under the splint.    Do not remove your splint unless your healthcare provider or orthopedic surgeon says it is okay.    Assistive devices:  You may be given a hard-soled shoe to wear while your foot is healing. You also may need to  use crutches to help you walk while your foot heals. It is important to use your crutches correctly. Ask for more information about how to use crutches.  Follow up with your doctor or bone specialist as directed:  You may need to return to have your splint or stitches removed. You may also need to return for tests to make sure your foot is healing. Write down your questions so you remember to ask them during your visits.  © Copyright [employer] 2023 Information is for End User's use only and may not be sold, redistributed or otherwise used for commercial purposes.  The above information is an  only. It is not intended as medical advice for individual conditions or treatments. Talk to your doctor, nurse or pharmacist before following any medical regimen to see if it is safe and effective for you.      Follow up with PCP in 3-5 days.  Proceed to  ER if symptoms worsen.    Chief Complaint     Chief Complaint   Patient presents with    Fall     Patient fell down slippery steps last night injuring lower left leg.         History of Present Illness      Slipped down about 4 outside steps with lower extremity tucked underneath her yesterday--pain from knee down--shin and foot, not really in ankle.  Presents accompanied by her teenage daughter.  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        Review of Systems     Review of Systems   Musculoskeletal:  Positive for arthralgias and joint swelling.   Skin:  Positive for wound.   All other systems reviewed and are negative.        Current Medications     No current outpatient medications on file.    Current Allergies     Allergies as of 01/29/2024    (No Known Allergies)              The following portions of the patient's history were reviewed and updated as appropriate: allergies, current medications, past family history, past medical history, past social history, past surgical history and problem list.     History reviewed. No pertinent past medical  "history.    Past Surgical History:   Procedure Laterality Date    CHOLECYSTECTOMY LAPAROSCOPIC N/A 1/30/2023    Procedure: CHOLECYSTECTOMY LAPAROSCOPIC;  Surgeon: Jairo Philippe MD;  Location: [facility];  Service: General    TUBAL LIGATION         Family History   Problem Relation Age of Onset    Thyroid disease Mother         Hypo    Atrial fibrillation Mother     Heart disease Father     No Known Problems Daughter     No Known Problems Daughter     No Known Problems Son     No Known Problems Son     No Known Problems Maternal Aunt     No Known Problems Maternal Aunt     No Known Problems Maternal Aunt     No Known Problems Maternal Aunt     No Known Problems Paternal Aunt     Stroke Maternal Grandmother     Diabetes Maternal Grandmother     No Known Problems Paternal Grandmother          Medications have been verified.        Objective     /82   Pulse 79   Resp 18   Ht 5' 3\" (1.6 m)   Wt 73.9 kg (163 lb)   LMP 01/08/2024 (Approximate)   SpO2 100%   BMI 28.87 kg/m²   Patient's last menstrual period was 01/08/2024 (approximate).         Physical Exam     Physical Exam  Vitals and nursing note reviewed.   Constitutional:       General: She is not in acute distress.     Appearance: Normal appearance. She is well-developed. She is not toxic-appearing or diaphoretic.   HENT:      Head: Normocephalic and atraumatic.   Eyes:      Pupils: Pupils are equal, round, and reactive to light.   Pulmonary:      Effort: Pulmonary effort is normal. No respiratory distress.   Abdominal:      General: There is no distension.      Palpations: Abdomen is soft.   Musculoskeletal:         General: Swelling, tenderness and signs of injury present. No deformity. Normal range of motion.      Cervical back: Normal range of motion and neck supple.      Left lower leg: Swelling, tenderness and bony tenderness present. No deformity or lacerations (superficial abrasion mid calf anterior medial aspect, clean and MAGDALENO).      Left " ankle: Normal.      Left foot: Swelling, tenderness and bony tenderness (1st toe, 4th toe, 3rd metatarsal, 4th metatarsal.  After viewing x-ray, patient IS point-tender over the lateral cuboid bone where avulsion is seen) present.   Skin:     General: Skin is warm and dry.      Capillary Refill: Capillary refill takes less than 2 seconds.      Findings: Abrasion and ecchymosis present.          Neurological:      General: No focal deficit present.      Mental Status: She is alert and oriented to person, place, and time.   Psychiatric:         Mood and Affect: Mood and affect normal.         Behavior: Behavior normal. Behavior is cooperative.         Thought Content: Thought content normal.         Judgment: Judgment normal.

## 2024-01-29 NOTE — LETTER
January 30, 2024     Maureen Car DO  [address] [facility] [address]  [place] [id number]    Patient: Sera Garner   YOB: 1980   Date of Visit: 1/29/2024       Dear Dr. Car:    Thank you for referring Sera Garner to me for evaluation. Below are my notes for this consultation.    If you have questions, please do not hesitate to call me. I look forward to following your patient along with you.         Sincerely,        Matilde Doyle DPM        CC: No Recipients    Matilde Doyle DPM  1/29/2024  4:05 PM  Signed  Assessment/Plan:    No problem-specific Assessment & Plan notes found for this encounter.       Diagnoses and all orders for this visit:    Closed nondisplaced fracture of fourth metatarsal bone of left foot, initial encounter  -     CT lower extremity wo contrast left; Future    Left foot pain  -     Ambulatory Referral to Orthopedic Surgery  -     CT lower extremity wo contrast left; Future      Plan:     - Patient was counseled and educated on the condition and the diagnosis.  The diagnosis, treatment options and prognosis were discussed in detail.   -Left foot x-rays reviewed, I personally reviewed x-ray finding with patient which is consistent with probably nondisplaced fourth and third metatarsal base fracture.  I will obtain a CT for further evaluation and management of this.  I will reach out to patient once CT scan is done with results and treatment recommendations..  -Patient to continue weight-bear as tolerated cam boot, rest ice elevate and compress with compression wrap.  Patient may take over-the-counter anti-inflammatories as needed for pain control.  -Patient to continue wearing cam boot with weight-bear as tolerated for 4 weeks  -Addressed all questions and concerns  -Recent urgent care notes reviewed    Subjective:      Patient ID: Sera Garner is a 43 y.o. female.    43-year-old female with past medical history as below presents for evaluation of  left foot injury sustained yesterday night.  Patient reports she fell off hardwood floor 4 steps and injured her left foot.  Patient reports the pain is on the midfoot along the third and fourth metatarsals along with bruising.  She was evaluated urgent care earlier this morning and recommended to follow-up with podiatrist for further care and management of possible displaced cuboid fracture.  She also had an injury to her leg with mild leg scratch.  She was given a boot which she has been wearing it.  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  She has no other foot complications.        The following portions of the patient's history were reviewed and updated as appropriate: She  has no past medical history on file.  She   Patient Active Problem List    Diagnosis Date Noted   • Closed nondisplaced fracture of fourth metatarsal bone of left foot 01/29/2024     She  has a past surgical history that includes Tubal ligation and CHOLECYSTECTOMY LAPAROSCOPIC (N/A, 1/30/2023).  No current outpatient medications on file.     No current facility-administered medications for this visit.   .    Review of Systems   All other systems reviewed and are negative.        Objective:      /74 (BP Location: Right arm, Patient Position: Sitting, Cuff Size: Standard)   Pulse 80   Wt 73.9 kg (163 lb)   LMP 01/08/2024 (Approximate)   BMI 28.87 kg/m²          Physical Exam  Vitals reviewed.   Musculoskeletal:      Left foot: Decreased range of motion. Swelling and tenderness present.      Comments: Tenderness to touch noted on the left midfoot along the third and fourth metatarsal bases.  Mild edema and ecchymosis noted to this area.  No pain with palpation along the cuboid dorsally laterally and plantarly.  No pain along the second and first TMT joints.  Palpable pedal pulses.  Light touch sensation intact.  Capillary fill time within normal limits.

## 2024-01-29 NOTE — LETTER
January 29, 2024     Patient: Sera Garner   YOB: 1980   Date of Visit: 1/29/2024       To Whom It May Concern:    It is my medical opinion that Sera Garner should remain out of work until cleared by ortho.  Please excuse for time missed .    If you have any questions or concerns, please don't hesitate to call.         Sincerely,        TIMOTEO Rodriguez    CC: No Recipients

## 2024-01-29 NOTE — PATIENT INSTRUCTIONS
Foot Fracture in Adults   AMBULATORY CARE:   A foot fracture  is a break in a bone in your foot.       Common signs and symptoms:   Tenderness over the injured area    Foot pain that increases when you try to stand or walk    Numbness in your foot or toes    Cracking sounds when you move your foot    Swelling, bruising, blistering, or open skin breaks    Trouble moving your foot or walking    Foot shape that is not normal    Call your local emergency number (911 in the [place]) if:   You suddenly feel lightheaded and short of breath.    You have chest pain when you take a deep breath or cough.    You cough up blood.    Seek care immediately if:   The pain in your injured foot gets worse even after you rest and take pain medicine.    The skin or toes of your foot become numb, swollen, cold, white, or blue.    You have more pain or swelling than you did before a cast was put on.    Your leg feels warm, tender, and painful. It may look swollen and red.    Call your doctor if:   You have a fever.    You have new sores around your boot, cast, or splint.    You have new or worsening trouble moving your foot.    You notice a foul smell coming from under your cast.    Your boot, cast, or splint gets damaged.    You have questions or concerns about your condition or care.    Treatment  depends on the kind of fracture you have and how bad it is. You may need any of the following:  A boot, cast, or splint  may be put on your foot and lower leg to decrease your foot movement. These work to hold the broken bones in place, decrease pain, and prevent more damage to your foot.    Medicines  may be given to prevent or treat pain or a bacterial infection. You may also need a vaccine to prevent tetanus if bone broke through the skin. A tetanus shot is given if you have not had a booster in the past 5 to 10 years.    Surgery  may be used to put your bones back into the correct position. Wires, pins, plates, or screws may be used to keep the  broken pieces lined up correctly and hold them together.    Self-care:   Rest  your foot and avoid activities that cause pain.    Apply ice  to decrease swelling and pain, and to prevent tissue damage. Use an ice pack, or put crushed ice in a plastic bag. Cover it with a towel before you apply it. Use ice for 15 to 20 minutes every hour or as directed.    Elevate your foot  above the level of your heart as often as you can. This will help decrease swelling and pain. Prop your foot on pillows or blankets to keep it elevated comfortably.         Physical therapy  may be needed when your foot has healed. A physical therapist can teach you exercises to help improve movement and strength, and to decrease pain.    Cast or splint care:   Ask when it is okay to take a bath or shower. Do not let your cast or splint get wet. Before you bathe, cover the cast or splint with a plastic bag. Tape the bag to your skin above the splint to seal out water. Keep your foot out of the water in case the bag leaks.    Check the skin around your splint daily for any redness or open areas.    Do not use a sharp or pointed object to scratch your skin under the splint.    Do not remove your splint unless your healthcare provider or orthopedic surgeon says it is okay.    Assistive devices:  You may be given a hard-soled shoe to wear while your foot is healing. You also may need to use crutches to help you walk while your foot heals. It is important to use your crutches correctly. Ask for more information about how to use crutches.  Follow up with your doctor or bone specialist as directed:  You may need to return to have your splint or stitches removed. You may also need to return for tests to make sure your foot is healing. Write down your questions so you remember to ask them during your visits.  © Copyright [employer] 2023 Information is for End User's use only and may not be sold, redistributed or otherwise used for commercial purposes.  The  above information is an  only. It is not intended as medical advice for individual conditions or treatments. Talk to your doctor, nurse or pharmacist before following any medical regimen to see if it is safe and effective for you.

## 2024-01-30 ENCOUNTER — HOSPITAL ENCOUNTER (OUTPATIENT)
Dept: CT IMAGING | Facility: HOSPITAL | Age: 44
Discharge: HOME | End: 2024-01-30
Attending: STUDENT IN AN ORGANIZED HEALTH CARE EDUCATION/TRAINING PROGRAM
Payer: COMMERCIAL

## 2024-01-30 DIAGNOSIS — S92.345A CLOSED NONDISPLACED FRACTURE OF FOURTH METATARSAL BONE OF LEFT FOOT, INITIAL ENCOUNTER: ICD-10-CM

## 2024-01-30 DIAGNOSIS — M79.672 LEFT FOOT PAIN: ICD-10-CM

## 2024-01-30 PROCEDURE — G1004 CDSM NDSC: HCPCS

## 2024-01-30 PROCEDURE — 73700 CT LOWER EXTREMITY W/O DYE: CPT

## 2024-01-31 ENCOUNTER — TELEPHONE (OUTPATIENT)
Age: 44
End: 2024-01-31

## 2024-01-31 ENCOUNTER — TELEPHONE (OUTPATIENT)
Dept: PODIATRY | Facility: CLINIC | Age: 44
End: 2024-01-31

## 2024-01-31 NOTE — TELEPHONE ENCOUNTER
Caller: Lindy Garner     Doctor/Office: [facility] LU#: [phone]    Escalation: Care Patient returning a phone call to  Bella  She would like you to please call her back

## 2024-01-31 NOTE — TELEPHONE ENCOUNTER
Caller: Lindy Garner    Doctor: Vivek Ramirez    Reason for call: Lindy is retruning a call regarding her work note. She will not be allowed to work w/restrictions and the the boot.     Call back#: [phone]

## 2024-01-31 NOTE — TELEPHONE ENCOUNTER
Caller: Patient    Doctor/Office: na    Call regarding :  Returning call     Call was transferred to: Pod

## 2024-01-31 NOTE — TELEPHONE ENCOUNTER
Caller: patient     Doctor: Vivek     Reason for call: patient called and asked for a work note to be placed. Patient is asking for a note to be excused from work until their appt on 2/23 where they will be accessed again at that time    Patient will get note off of [employer]    Call back#: N/A

## 2024-02-01 ENCOUNTER — DOCUMENTATION (OUTPATIENT)
Dept: PODIATRY | Facility: CLINIC | Age: 44
End: 2024-02-01

## 2024-02-07 ENCOUNTER — TELEPHONE (OUTPATIENT)
Dept: PAIN MEDICINE | Facility: CLINIC | Age: 44
End: 2024-02-07

## 2024-02-09 ENCOUNTER — TELEPHONE (OUTPATIENT)
Dept: OBGYN CLINIC | Facility: HOSPITAL | Age: 44
End: 2024-02-09

## 2024-02-09 NOTE — TELEPHONE ENCOUNTER
Caller: Patient    Doctor/Office: PRISCA Doyle    #: [phone]      What needs to be faxed: JONAS Paperwork and Work Status Letter    ATTN to: [employer]    Fax#: [phone]      Documents were successfully e-faxed

## 2024-02-14 ENCOUNTER — OFFICE VISIT (OUTPATIENT)
Dept: OBGYN CLINIC | Facility: CLINIC | Age: 44
End: 2024-02-14
Payer: COMMERCIAL

## 2024-02-14 VITALS
SYSTOLIC BLOOD PRESSURE: 120 MMHG | DIASTOLIC BLOOD PRESSURE: 76 MMHG | WEIGHT: 159 LBS | BODY MASS INDEX: 28.17 KG/M2 | HEIGHT: 63 IN

## 2024-02-14 DIAGNOSIS — Z12.31 ENCOUNTER FOR SCREENING MAMMOGRAM FOR BREAST CANCER: ICD-10-CM

## 2024-02-14 DIAGNOSIS — N94.10 DYSPAREUNIA IN FEMALE: ICD-10-CM

## 2024-02-14 DIAGNOSIS — Z01.419 ENCOUNTER FOR ANNUAL ROUTINE GYNECOLOGICAL EXAMINATION: Primary | ICD-10-CM

## 2024-02-14 DIAGNOSIS — N39.3 SUI (STRESS URINARY INCONTINENCE, FEMALE): ICD-10-CM

## 2024-02-14 PROCEDURE — 99396 PREV VISIT EST AGE 40-64: CPT | Performed by: STUDENT IN AN ORGANIZED HEALTH CARE EDUCATION/TRAINING PROGRAM

## 2024-02-14 NOTE — PROGRESS NOTES
[facility]   Annual Well Woman Exam  [place]    ASSESSMENT & PLAN: Sera Garner is a 43 y.o.  with normal gynecologic exam.    1.  Routine well woman exam done today.  2.  Pap and HPV: Pap with HPV was done today.  Current ASCCP Guidelines reviewed.  3.  Mammogram ordered. Recommend yearly mammography per ACOG guidelines.   4.  Sera is at low risk for STI, declines STD testing.    5.  Sera is using sterilization for contraception and options have been discussed.    6. The following were reviewed in today's visit: discussed TIFFANY (not ready for surgical conversation, will consider PFPT) using pads less often will likely help with frequent discharge, infections, UTI symptoms--none today  7. Return to office in 12 months for annual, sooner PRN.     All questions answered to the best of my ability.      Subjective:    CC:  Annual Gynecologic Examination    HPI: Sera Garner is a 43 y.o.  who presents for annual gynecologic examination.      GYN  Sexually active: yes, with   Dyspareunia with deep penetration  Birth control: sterilization  Hx STI: denies   Last Pap: unsure, all normal     OB   (4xSVDs)     G/U  Complaints: significant TIFFANY, progressively worse, no splinting for urination or Bms  Wearing a pad every day even at night  Discussed period underwear  Denies hematuria and dysuria  Discussed PFPT     Breast  Complaints: denies  Denies: breast lump, breast tenderness, nipple discharge and skin color change  Family hx: denies fhx of breast, uterine, ovarian, or colon cancers     Health Maintenance:    She does follow with a PCP.  She exercises: walks all day at work (12-hr shifts)    She feels safe at home (living with  and two kids), no domestic violence.  She is working on improving her diet---well balanced diet  Yogurt daily is helping with recurrent yeast/UTI  She does not use tobacco.      History reviewed. No pertinent past medical history.    Past  Surgical History:   Procedure Laterality Date    CHOLECYSTECTOMY LAPAROSCOPIC N/A 1/30/2023    Procedure: CHOLECYSTECTOMY LAPAROSCOPIC;  Surgeon: Jairo Philippe MD;  Location: [facility];  Service: General    TUBAL LIGATION         Past OB/Gyn History:     Patient's last menstrual period was 02/01/2024 (approximate).    Family History:  Family History   Problem Relation Age of Onset    Thyroid disease Mother         Hypo    Atrial fibrillation Mother     Heart disease Father     No Known Problems Daughter     No Known Problems Daughter     No Known Problems Son     No Known Problems Son     No Known Problems Maternal Aunt     No Known Problems Maternal Aunt     No Known Problems Maternal Aunt     No Known Problems Maternal Aunt     No Known Problems Paternal Aunt     Stroke Maternal Grandmother     Diabetes Maternal Grandmother     No Known Problems Paternal Grandmother        Social History:  Social History     Socioeconomic History    Marital status: /Civil Union     Spouse name: Not on file    Number of children: Not on file    Years of education: Not on file    Highest education level: Not on file   Occupational History    Not on file   Tobacco Use    Smoking status: Never    Smokeless tobacco: Never   Vaping Use    Vaping status: Never Used   Substance and Sexual Activity    Alcohol use: Yes     Alcohol/week: 2.0 standard drinks of alcohol     Types: 2 Glasses of wine per week     Comment: occasionally     Drug use: Never    Sexual activity: Yes     Partners: Male     Birth control/protection: Other     Comment: Tubal ligation   Other Topics Concern    Not on file   Social History Narrative    Not on file     Social Determinants of Health     Financial Resource Strain: Not on file   Food Insecurity: Not on file   Transportation Needs: No Transportation Needs (2/19/2020)    PRAPARE - Transportation     Lack of Transportation (Medical): No     Lack of Transportation (Non-Medical): No   Physical  "Activity: Unknown (2/19/2020)    Exercise Vital Sign     Days of Exercise per Week: 4 days     Minutes of Exercise per Session: Not on file   Stress: Not on file   Social Connections: Not on file   Intimate Partner Violence: Not on file   Housing Stability: Not on file     No Known Allergies  No current outpatient medications on file.    Review of Systems:  Denies chest pain, shortness of breath, abdominal pain, nausea, vomiting, urinary frequency, vaginal bleeding, vaginal discharge. All other systems negative unless otherwise stated.     Physical Exam:  /76 (BP Location: Left arm, Patient Position: Sitting, Cuff Size: Standard)   Ht 5' 3\" (1.6 m)   Wt 72.1 kg (159 lb)   LMP 02/01/2024 (Approximate)   BMI 28.17 kg/m²  Body mass index is 28.17 kg/m².   GEN: The patient was alert and oriented x3, pleasant well-appearing female in no acute distress.   HEENT:  Unremarkable, no anterior or posterior lymphadenopathy, no thyromegaly  CV:  Regular rate and rhythm, normal S1 and S2, no murmurs  RESP:  Clear to auscultation bilaterally, no wheezes, rales or rhonchi  BREAST:  Symmetric breasts with no palpable breast masses or obvious breast lesions. She has no retractions or nipple discharge. She has no axillary abnormalities or palpable masses.   GI:  Soft, nontender, non-distended  MSK: bilateral lower extremities are nontender, no edema  : Normal appearing external female genitalia, normal appearing urethral meatus. On sterile speculum exam, normal appearing vaginal epithelium, no vaginal discharge, no bleeding, grossly normal appearing cervix. On bimanual exam, no cervical motion tenderness; uterus is smooth, mobile, non-tender normal size. Able to engage pelvic floor muscles, no ttp, No tenderness or fullness in the bilateral adnexa.     "

## 2024-02-19 LAB
CYTOLOGIST CVX/VAG CYTO: NORMAL
DX ICD CODE: NORMAL
HPV GENOTYPE REFLEX: NORMAL
HPV I/H RISK 4 DNA CVX QL PROBE+SIG AMP: NEGATIVE
OTHER STN SPEC: NORMAL
PATH REPORT.FINAL DX SPEC: NORMAL
SL AMB NOTE:: NORMAL
SL AMB SPECIMEN ADEQUACY: NORMAL
SL AMB TEST METHODOLOGY: NORMAL

## 2024-02-23 ENCOUNTER — OFFICE VISIT (OUTPATIENT)
Dept: PODIATRY | Facility: CLINIC | Age: 44
End: 2024-02-23
Payer: COMMERCIAL

## 2024-02-23 ENCOUNTER — APPOINTMENT (OUTPATIENT)
Dept: RADIOLOGY | Facility: CLINIC | Age: 44
End: 2024-02-23
Payer: COMMERCIAL

## 2024-02-23 VITALS
HEIGHT: 63 IN | BODY MASS INDEX: 28.17 KG/M2 | DIASTOLIC BLOOD PRESSURE: 69 MMHG | SYSTOLIC BLOOD PRESSURE: 146 MMHG | WEIGHT: 159 LBS | HEART RATE: 99 BPM

## 2024-02-23 DIAGNOSIS — S92.345A CLOSED NONDISPLACED FRACTURE OF FOURTH METATARSAL BONE OF LEFT FOOT, INITIAL ENCOUNTER: ICD-10-CM

## 2024-02-23 DIAGNOSIS — S92.345D CLOSED NONDISPLACED FRACTURE OF FOURTH METATARSAL BONE OF LEFT FOOT WITH ROUTINE HEALING, SUBSEQUENT ENCOUNTER: Primary | ICD-10-CM

## 2024-02-23 PROCEDURE — 73630 X-RAY EXAM OF FOOT: CPT

## 2024-02-23 PROCEDURE — 99213 OFFICE O/P EST LOW 20 MIN: CPT | Performed by: STUDENT IN AN ORGANIZED HEALTH CARE EDUCATION/TRAINING PROGRAM

## 2024-02-23 NOTE — LETTER
February 23, 2024     Patient: Sera Garner  YOB: 1980  Date of Visit: 2/23/2024      To Whom it May Concern:    Sera Garner is under my professional care. Sera was seen in my office on 2/23/2024. Sera can return to without any restrictions starting 2/26/2024.     If you have any questions or concerns, please don't hesitate to call.         Sincerely,          Matilde Doyle DPM        CC: No Recipients

## 2024-02-23 NOTE — PROGRESS NOTES
Assessment/Plan:    No problem-specific Assessment & Plan notes found for this encounter.       Diagnoses and all orders for this visit:    Closed nondisplaced fracture of fourth metatarsal bone of left foot with routine healing, subsequent encounter  -     X-ray foot left 3+ views; Future      Plan:     - Patient was counseled and educated on the condition and the diagnosis.  The diagnosis, treatment options and prognosis were discussed in detail.   -Left foot x-rays obtained, I presented with x-ray finding with patient which is consistent with healing fourth metatarsal fracture.  No displacement noted.  -Recommended patient to transition to sneakers with weight-bear as tolerated she is to also start at home routine stretching and strengthening exercises for lower extremity.  -Return to work note dispensed  -Addressed all questions and concerns    Subjective:      Patient ID: Sera Garner is a 44 y.o. female.    44-year-old female with past medical history as below presents for evaluation of left fourth metatarsal base fracture.  Patient reports she has been wearing cam boot as recommended.  She does not complain of any pain or discomfort.  A couple days ago she tried walking without the boot in sneakers and tolerated well.  Patient reports she is ready to go back to work.  She has no other complaints at this time.        The following portions of the patient's history were reviewed and updated as appropriate: She  has no past medical history on file.  She   Patient Active Problem List    Diagnosis Date Noted    Closed nondisplaced fracture of fourth metatarsal bone of left foot 01/29/2024     She  has a past surgical history that includes Tubal ligation and CHOLECYSTECTOMY LAPAROSCOPIC (N/A, 1/30/2023).  No current outpatient medications on file.     No current facility-administered medications for this visit.   .    Review of Systems   All other systems reviewed and are negative.        Objective:      BP  "146/69   Pulse 99   Ht 5' 3\" (1.6 m)   Wt 72.1 kg (159 lb)   LMP 02/01/2024 (Approximate)   BMI 28.17 kg/m²          Physical Exam  Vitals reviewed.   Musculoskeletal:      Left foot: Normal range of motion. No tenderness.      Comments: No tenderness to touch noted to the left midfoot at the level of third and fourth TMT joints.  No swelling noted.  Light touch sensation intact.  Palpable pedal pulses.  Muscle Minitabs 5 out of 5.  Ankle and foot range of motion within normal limits without any discomfort.           "

## 2024-05-13 ENCOUNTER — OFFICE VISIT (OUTPATIENT)
Dept: URGENT CARE | Facility: CLINIC | Age: 44
End: 2024-05-13
Payer: COMMERCIAL

## 2024-05-13 VITALS
WEIGHT: 159 LBS | BODY MASS INDEX: 28.17 KG/M2 | HEART RATE: 75 BPM | DIASTOLIC BLOOD PRESSURE: 76 MMHG | HEIGHT: 63 IN | SYSTOLIC BLOOD PRESSURE: 128 MMHG | OXYGEN SATURATION: 99 % | RESPIRATION RATE: 18 BRPM

## 2024-05-13 DIAGNOSIS — L23.7 POISON IVY: Primary | ICD-10-CM

## 2024-05-13 PROCEDURE — G0382 LEV 3 HOSP TYPE B ED VISIT: HCPCS | Performed by: NURSE PRACTITIONER

## 2024-05-13 PROCEDURE — S9083 URGENT CARE CENTER GLOBAL: HCPCS | Performed by: NURSE PRACTITIONER

## 2024-05-13 RX ORDER — PREDNISONE 20 MG/1
TABLET ORAL
Qty: 26 TABLET | Refills: 0 | Status: SHIPPED | OUTPATIENT
Start: 2024-05-13

## 2024-05-13 NOTE — PROGRESS NOTES
[facility]        NAME: Sera aGrner is a 44 y.o. female  : 1980    MRN: [id number]  DATE: May 13, 2024  TIME: 1:19 PM      Assessment and Plan     Poison ivy [L23.7]  1. Poison ivy  predniSONE 20 mg tablet            Patient Instructions     Patient Instructions   Poison Ivy   WHAT YOU NEED TO KNOW:   What is poison ivy?  Poison ivy is a plant that can cause an itchy, uncomfortable rash on your skin. Poison ivy grows as a shrub or vine in woods, fields, and areas of thick underbrush. It has 3 bright green leaves on each stem that turn red in .   What causes a poison ivy rash?  A poison ivy rash can occur when the plant oil soaks into your skin. You may get a rash if you touch:  Any part of the poison ivy plant:  This includes the leaves, stem, vine, roots, flowers, and berries.    Pets with poison ivy on their fur:  They can spread poison ivy oil to your skin and to items inside your car and house.     Items with poison ivy oil on them:  This includes clothing, shoes, camping or sports equipment, or outdoor tools.    A person with poison ivy oil on him:  Poison ivy oil may be on their skin or clothing.    What can I do if I have been exposed to poison ivy?  If you think you have touched poison ivy, rinse your skin with cool water right away. Then, wash it with soap and water. Rinse your skin well. Do not use hot water because it may cause the oil to spread on your skin. You may also put rubbing alcohol or a solution of 1/2 alcohol and 1/2 water on your skin. This may help your rash to be less severe when it breaks out on your skin.   What are the signs and symptoms of a poison ivy rash?   A red, swollen, itchy rash that develops within hours to days of exposure to poison ivy    A rash that appears in thick patches or thin lines where the plant leaves rubbed against your skin    Blisters that may leak clear to yellow liquid, then crust over and become scaly    How is a poison ivy  rash treated?   Antiseptic or drying creams or ointments:  Your healthcare provider may recommend medicine to dry out the rash and decrease the itching. These products may be available without a doctor's order.     Steroids:  This medicine helps decrease itching and inflammation. It can be given as a cream to apply to your skin or as a pill.     Antihistamines:  This medicine may help decrease itching and help you sleep. It is available without a doctor's order.    How can I manage my symptoms?   Keep your rash clean and dry:  Wash it with soap and water. Gently pat it dry with a clean towel.    Try not to scratch or rub your rash:  This can cause your skin to become infected.    Use a compress on your rash:  Dip a clean washcloth in cool water. Wring it out and place it on your rash. Leave the washcloth on your skin for 15 minutes. Do this at least 3 times per day.     Take a cornstarch or oatmeal bath:  If your rash is too large to cover with wet washcloths, take 3 or 4 cornstarch baths daily. Mix 1 pound of cornstarch with a little water to make a paste. Add the paste to a tub full of water and mix well. You may also use colloidal oatmeal in the bath water. Use lukewarm water. Avoid hot water because it may cause your itching to increase.    Can a poison ivy rash be spread by scratching or touching it?  You cannot spread poison ivy by touching your rash or the liquid from your blisters. Poison ivy is spread only if you scratch your skin while it still has oil on it. You may think your rash is spreading because new rashes appear over a number of days. This happens because areas covered by thin skin break out in a rash first. Your face or forearms may develop a rash before thicker areas, such as the palms of your hands.   How can I prevent a poison ivy rash?   Wear skin protection:  Wear long pants, a long-sleeved shirt, and gloves. Use a skin block lotion to protect your skin from poison ivy oil. You can find this  at a drugstore without a prescription.    Wash clothing after possible exposure:  If you think you have been near a poison ivy plant, wash the clothes you were wearing separately from other clothes. Rinse the washing machine well after you take the clothes out. Scrub boots and shoes with warm, soapy water. Dry clean items and clothing that you cannot wash in water. Poison ivy oil is sticky and can stay on surfaces for a long time. It can cause a new rash even years later.     Bathe your pet:  Use warm water and shampoo on your pet's fur. This will prevent the spread of oil to your skin, car, and home. Wear long sleeves, long pants, and gloves while washing pets or any items that may have oil on them.    Reduce exposure to poison ivy:  Do not touch plants that look like poison ivy. Keep your yard free of poison ivy. While protecting your skin, remove the plant and the roots. Place them in a plastic bag and seal the bag tightly.     Do not burn poison ivy plants:  This can spread the oil through the air. If you breathe the oil into your lungs, you could have swelling and serious breathing problems. Oil that clings to the fire armani can land on your skin and cause a rash.    When should I contact my healthcare provider?   You have pus, soft yellow scabs, or tenderness on the rash.    The itching gets worse or keeps you awake at night.    The rash covers more than 1/4 of your skin or spreads to your eyes, mouth, or genital area.     The rash is not better after 2 to 3 weeks.    You have tender, swollen glands on the sides of your neck.    You have swelling in your arms and legs.    You have questions or concerns about your condition or care.    When should I seek immediate care or call 911?   You have a fever.    You have redness, swelling, and tenderness around the rash.    You have trouble breathing.    CARE AGREEMENT:   You have the right to help plan your care. Learn about your health condition and how it may be  treated. Discuss treatment options with your healthcare providers to decide what care you want to receive. You always have the right to refuse treatment. The above information is an  only. It is not intended as medical advice for individual conditions or treatments. Talk to your doctor, nurse or pharmacist before following any medical regimen to see if it is safe and effective for you.  © Copyright [employer] 2023 Information is for End User's use only and may not be sold, redistributed or otherwise used for commercial purposes.      Follow up with PCP in 3-5 days.  Proceed to  ER if symptoms worsen.    Chief Complaint     Chief Complaint   Patient presents with    Poison Ivy     Patient has poison on right arm and going up chest and neck.         History of Present Illness     Patient presents accompanied by teenage daughter.  She has had poison ivy for a week and a half or 2 which is just not getting better.  She was weed whacking and did not realize she was exposed to poison ivy initially.  She tried to shower the oils off, but does have poison ivy scattered all over her arms, chest, neck.  Her teenage daughter was seen here on May 2 and given an oral taper which helped some although did not fully resolve the rash.  Daughter was also given 2.5% hydrocortisone cream which Lindy has been trying to use BID the last several days without improvement.        Review of Systems     Review of Systems   Skin:  Positive for rash.   All other systems reviewed and are negative.        Current Medications       Current Outpatient Medications:     predniSONE 20 mg tablet, 3 tabs daily x 4 days, 2 tabs daily x 4 days, 1 tab daily x 4 days, 1/2 tab daily x 4 days, Disp: 26 tablet, Rfl: 0    Current Allergies     Allergies as of 05/13/2024    (No Known Allergies)              The following portions of the patient's history were reviewed and updated as appropriate: allergies, current medications, past family history,  "past medical history, past social history, past surgical history and problem list.     History reviewed. No pertinent past medical history.    Past Surgical History:   Procedure Laterality Date    CHOLECYSTECTOMY LAPAROSCOPIC N/A 1/30/2023    Procedure: CHOLECYSTECTOMY LAPAROSCOPIC;  Surgeon: Jairo Philippe MD;  Location: [facility];  Service: General    TUBAL LIGATION         Family History   Problem Relation Age of Onset    Thyroid disease Mother         Hypo    Atrial fibrillation Mother     Heart disease Father     No Known Problems Daughter     No Known Problems Daughter     No Known Problems Son     No Known Problems Son     No Known Problems Maternal Aunt     No Known Problems Maternal Aunt     No Known Problems Maternal Aunt     No Known Problems Maternal Aunt     No Known Problems Paternal Aunt     Stroke Maternal Grandmother     Diabetes Maternal Grandmother     No Known Problems Paternal Grandmother          Medications have been verified.        Objective     /76   Pulse 75   Resp 18   Ht 5' 3\" (1.6 m)   Wt 72.1 kg (159 lb)   SpO2 99%   BMI 28.17 kg/m²   No LMP recorded.         Physical Exam     Physical Exam  Vitals and nursing note reviewed.   Constitutional:       General: She is not in acute distress.     Appearance: Normal appearance. She is well-developed. She is not ill-appearing, toxic-appearing or diaphoretic.   HENT:      Head: Normocephalic and atraumatic.   Pulmonary:      Effort: Pulmonary effort is normal. No respiratory distress.   Abdominal:      General: There is no distension.   Musculoskeletal:         General: Normal range of motion.      Cervical back: Normal range of motion and neck supple.   Skin:     General: Skin is warm and dry.      Capillary Refill: Capillary refill takes less than 2 seconds.      Findings: Rash (Scattered poison ivy rash on neck, chest, right arm) present. Rash is papular.   Neurological:      General: No focal deficit present.      Mental " Status: She is alert and oriented to person, place, and time.   Psychiatric:         Mood and Affect: Mood and affect normal.         Behavior: Behavior normal. Behavior is cooperative.         Thought Content: Thought content normal.         Judgment: Judgment normal.

## 2024-05-13 NOTE — PATIENT INSTRUCTIONS
Poison Ivy   WHAT YOU NEED TO KNOW:   What is poison ivy?  Poison ivy is a plant that can cause an itchy, uncomfortable rash on your skin. Poison ivy grows as a shrub or vine in woods, fields, and areas of thick underbrush. It has 3 bright green leaves on each stem that turn red in bigg.   What causes a poison ivy rash?  A poison ivy rash can occur when the plant oil soaks into your skin. You may get a rash if you touch:  Any part of the poison ivy plant:  This includes the leaves, stem, vine, roots, flowers, and berries.    Pets with poison ivy on their fur:  They can spread poison ivy oil to your skin and to items inside your car and house.     Items with poison ivy oil on them:  This includes clothing, shoes, camping or sports equipment, or outdoor tools.    A person with poison ivy oil on him:  Poison ivy oil may be on their skin or clothing.    What can I do if I have been exposed to poison ivy?  If you think you have touched poison ivy, rinse your skin with cool water right away. Then, wash it with soap and water. Rinse your skin well. Do not use hot water because it may cause the oil to spread on your skin. You may also put rubbing alcohol or a solution of 1/2 alcohol and 1/2 water on your skin. This may help your rash to be less severe when it breaks out on your skin.   What are the signs and symptoms of a poison ivy rash?   A red, swollen, itchy rash that develops within hours to days of exposure to poison ivy    A rash that appears in thick patches or thin lines where the plant leaves rubbed against your skin    Blisters that may leak clear to yellow liquid, then crust over and become scaly    How is a poison ivy rash treated?   Antiseptic or drying creams or ointments:  Your healthcare provider may recommend medicine to dry out the rash and decrease the itching. These products may be available without a doctor's order.     Steroids:  This medicine helps decrease itching and inflammation. It can be given  as a cream to apply to your skin or as a pill.     Antihistamines:  This medicine may help decrease itching and help you sleep. It is available without a doctor's order.    How can I manage my symptoms?   Keep your rash clean and dry:  Wash it with soap and water. Gently pat it dry with a clean towel.    Try not to scratch or rub your rash:  This can cause your skin to become infected.    Use a compress on your rash:  Dip a clean washcloth in cool water. Wring it out and place it on your rash. Leave the washcloth on your skin for 15 minutes. Do this at least 3 times per day.     Take a cornstarch or oatmeal bath:  If your rash is too large to cover with wet washcloths, take 3 or 4 cornstarch baths daily. Mix 1 pound of cornstarch with a little water to make a paste. Add the paste to a tub full of water and mix well. You may also use colloidal oatmeal in the bath water. Use lukewarm water. Avoid hot water because it may cause your itching to increase.    Can a poison ivy rash be spread by scratching or touching it?  You cannot spread poison ivy by touching your rash or the liquid from your blisters. Poison ivy is spread only if you scratch your skin while it still has oil on it. You may think your rash is spreading because new rashes appear over a number of days. This happens because areas covered by thin skin break out in a rash first. Your face or forearms may develop a rash before thicker areas, such as the palms of your hands.   How can I prevent a poison ivy rash?   Wear skin protection:  Wear long pants, a long-sleeved shirt, and gloves. Use a skin block lotion to protect your skin from poison ivy oil. You can find this at a drugstore without a prescription.    Wash clothing after possible exposure:  If you think you have been near a poison ivy plant, wash the clothes you were wearing separately from other clothes. Rinse the washing machine well after you take the clothes out. Scrub boots and shoes with warm,  soapy water. Dry clean items and clothing that you cannot wash in water. Poison ivy oil is sticky and can stay on surfaces for a long time. It can cause a new rash even years later.     Bathe your pet:  Use warm water and shampoo on your pet's fur. This will prevent the spread of oil to your skin, car, and home. Wear long sleeves, long pants, and gloves while washing pets or any items that may have oil on them.    Reduce exposure to poison ivy:  Do not touch plants that look like poison ivy. Keep your yard free of poison ivy. While protecting your skin, remove the plant and the roots. Place them in a plastic bag and seal the bag tightly.     Do not burn poison ivy plants:  This can spread the oil through the air. If you breathe the oil into your lungs, you could have swelling and serious breathing problems. Oil that clings to the fire armani can land on your skin and cause a rash.    When should I contact my healthcare provider?   You have pus, soft yellow scabs, or tenderness on the rash.    The itching gets worse or keeps you awake at night.    The rash covers more than 1/4 of your skin or spreads to your eyes, mouth, or genital area.     The rash is not better after 2 to 3 weeks.    You have tender, swollen glands on the sides of your neck.    You have swelling in your arms and legs.    You have questions or concerns about your condition or care.    When should I seek immediate care or call 911?   You have a fever.    You have redness, swelling, and tenderness around the rash.    You have trouble breathing.    CARE AGREEMENT:   You have the right to help plan your care. Learn about your health condition and how it may be treated. Discuss treatment options with your healthcare providers to decide what care you want to receive. You always have the right to refuse treatment. The above information is an  only. It is not intended as medical advice for individual conditions or treatments. Talk to your  doctor, nurse or pharmacist before following any medical regimen to see if it is safe and effective for you.  © Copyright [employer] 2023 Information is for End User's use only and may not be sold, redistributed or otherwise used for commercial purposes.

## (undated) DEVICE — IV CATH 14 G X 3 1/4 IN

## (undated) DEVICE — ASTOUND STANDARD SURGICAL GOWN, XL: Brand: CONVERTORS

## (undated) DEVICE — DISPOSABLE LAPAROSCOPIC CLIP APPLIER WITH 20 CLIPS.: Brand: EPIX® UNIVERSAL CLIP APPLIER

## (undated) DEVICE — ANTIBACTERIAL UNDYED BRAIDED (POLYGLACTIN 910), SYNTHETIC ABSORBABLE SUTURE: Brand: COATED VICRYL

## (undated) DEVICE — ENDOPOUCH RETRIEVER SPECIMEN RETRIEVAL BAGS: Brand: ENDOPOUCH RETRIEVER

## (undated) DEVICE — TAUT CATH INTRODUCER 4.5 FR

## (undated) DEVICE — PACK PBDS LAP CHOLE RF

## (undated) DEVICE — SUT VICRYL 0 UR-6 27 IN J603H

## (undated) DEVICE — GLOVE SRG BIOGEL 8

## (undated) DEVICE — GLOVE INDICATOR PI UNDERGLOVE SZ 6.5 BLUE

## (undated) DEVICE — STOPCOCK 3-WAY

## (undated) DEVICE — ENDOPATH 5 MM GRASPERS WITH RATCHET HANDLES: Brand: ENDOPATH

## (undated) DEVICE — TROCAR: Brand: KII® SLEEVE

## (undated) DEVICE — TROCAR 11MM KIT OPTICAL SEPARATOR SYSTEM

## (undated) DEVICE — TROCAR: Brand: KII FIOS FIRST ENTRY

## (undated) DEVICE — 5 MM CURVED DISSECTORS WITH MONOPOLAR CAUTERY: Brand: ENDOPATH

## (undated) DEVICE — IRRIGATOR DISPOSABLE SUCTION

## (undated) DEVICE — ELECTRODE LAP L HOOK E-Z CLEAN 33CM -0020

## (undated) DEVICE — INTENDED FOR TISSUE SEPARATION, AND OTHER PROCEDURES THAT REQUIRE A SHARP SURGICAL BLADE TO PUNCTURE OR CUT.: Brand: BARD-PARKER ® CARBON RIB-BACK BLADES

## (undated) DEVICE — PLUMEPEN PRO 10FT

## (undated) DEVICE — 3M™ STERI-STRIP™ REINFORCED ADHESIVE SKIN CLOSURES, R1547, 1/2 IN X 4 IN (12 MM X 100 MM), 6 STRIPS/ENVELOPE: Brand: 3M™ STERI-STRIP™

## (undated) DEVICE — INSUFFLATION NEEDLE: Brand: SURGINEEDLE

## (undated) DEVICE — 3M™ TEGADERM™ TRANSPARENT FILM DRESSING FRAME STYLE, 1624W, 2-3/8 IN X 2-3/4 IN (6 CM X 7 CM), 100/CT 4CT/CASE: Brand: 3M™ TEGADERM™

## (undated) DEVICE — STERISTRIP 1/2 X 4IN

## (undated) DEVICE — GLOVE INDICATOR PI UNDERGLOVE SZ 7 BLUE

## (undated) DEVICE — TUBING SMOKE EVAC W/FILTRATION DEVICE PLUMEPORT ACTIV

## (undated) DEVICE — LAPAROSCOPIC SCISSORS: Brand: EPIX LAPAROSCOPIC SCISSORS

## (undated) DEVICE — CHLORAPREP HI-LITE 26ML ORANGE

## (undated) DEVICE — NEEDLE 25G X 1 1/2

## (undated) DEVICE — SWABSTCK, BENZOIN TINCTURE, 1/PK, STRL: Brand: APLICARE

## (undated) DEVICE — GAUZE SPONGES,8 PLY: Brand: CURITY

## (undated) DEVICE — SPONGE GAUZE 2 X 2 4PLY STRL

## (undated) DEVICE — 4-PORT MANIFOLD: Brand: NEPTUNE 2

## (undated) DEVICE — GLOVE SRG BIOGEL 7

## (undated) DEVICE — GARMENT,MEDLINE,DVT,INT,CALF,FOAM,MED: Brand: MEDLINE